# Patient Record
Sex: MALE | Race: WHITE | NOT HISPANIC OR LATINO | Employment: FULL TIME | URBAN - METROPOLITAN AREA
[De-identification: names, ages, dates, MRNs, and addresses within clinical notes are randomized per-mention and may not be internally consistent; named-entity substitution may affect disease eponyms.]

---

## 2017-01-26 DIAGNOSIS — G35 MULTIPLE SCLEROSIS (H): Primary | ICD-10-CM

## 2017-01-26 RX ORDER — DIMETHYL FUMARATE 240 MG/1
240 CAPSULE ORAL 2 TIMES DAILY
Qty: 60 CAPSULE | Refills: 11 | Status: SHIPPED | OUTPATIENT
Start: 2017-01-26 | End: 2017-10-10

## 2017-01-26 NOTE — TELEPHONE ENCOUNTER
Received refill request for Tecfidera from [facility]; Patient was last seen in March 2016 and has follow up appointment in March 2017 with Dr Escobar; Refilled for 1 year per MS refill protocol.    Maci Veloz MS RN Care Coordinator

## 2017-02-16 ENCOUNTER — TELEPHONE (OUTPATIENT)
Dept: NEUROLOGY | Facility: CLINIC | Age: 32
End: 2017-02-16

## 2017-02-16 DIAGNOSIS — G35 MULTIPLE SCLEROSIS (H): Primary | ICD-10-CM

## 2017-02-16 RX ORDER — PREDNISONE 50 MG/1
1250 TABLET ORAL DAILY
Qty: 75 TABLET | Refills: 0 | Status: SHIPPED | OUTPATIENT
Start: 2017-02-16 | End: 2017-02-19

## 2017-02-16 NOTE — TELEPHONE ENCOUNTER
"I received the following message from our triage nurse:    Sx-numb torso to stomach, nausea, cold sweats x 1-2 wks and has been getting progressively worse. Had a bad cold and was rundown-cold sx gone but wife is due to deliver soon and stressful time.    I called Jair to discuss his symptoms; He said that about a week ago, he started having numbness on the left side of his body and in his torso from his nipple line to his middle hip area, as he describes it; He said that the numbness has now progressed over to his left side; He also states that he noticed yesterday that he has shanell-oral numbness; All of his sensory changes are constant; He said that he doesn't feel hunger or appetite at all either, which he attributes to the torso numbness, and states he just \"feels different\"; He does not have any focal weakness, but he does feel generally very fatigued; He said that he has, a long time ago, had numbness in this area, but not to this extent; No vision changes; No speech/swallowing changes; No clumsiness/balance issues notes; He feels like he gets light-headed and dizzy when he \"over does it\" and has been resting as much as possible lately; He has had increased urgency and frequency with his bladder, and doesn't feel that he is emptying his bladder; He has had diarrhea as well lately; He does not feel like he has had the flu at all, but he states that he did have a \"bad cold\" last week; In addition to that, he is under stress, as he has a 2-year old daughter and due to have another baby in a couple weeks; He is taking his Tecfidera and not missing any doses; He states \"I've had lots of relapses before that I didn't get treated for, but feel like this one needs to be treated\"; I told Jair that I would send this over to Dr Escobar, and that he may want to obtain MRI images before moving forward with steroids, and he understands that; Will route to Dr Escobar for input, then call him back later " today.    Maci Veloz MS RN Care Coordinator

## 2017-02-16 NOTE — TELEPHONE ENCOUNTER
Yes, I'd like to get MRIs because if this is true inflammatory relapse that will probably mean long-term treatment change, and in the context of a viral illness it is hard to tell if true inflammatory relapse or not.  Given the distribution of the symptoms, I would like to get all 3 MRIs (C, T, and B) - that's a long time in the scanner, and if necessary they can be broken up into different days (C and T take priority).

## 2017-02-16 NOTE — TELEPHONE ENCOUNTER
Sx-numbness of  Torso extending to stomach, nausea, cold sweats x 1-2 wks and has been getting progressively worse.  Had a bad cold and was rundown-cold sx gone but wife is due to deliver soon;  Pt going through a  stressful time.    Will route to Maci Veloz and Dr Escobar

## 2017-02-16 NOTE — TELEPHONE ENCOUNTER
Patient called the triage nurse line back again and stated that his wife is dilated and that he just doesn't think he is going to have time to get MRIs done; I talked with Dr Escobar, and he is fine with treating him with 3 days of steroids, but he still has to have the imaging done; In previously talking with Jair, he would like to complete this at [facility] (phone [phone] fax [phone]); I called Jair back, and he is fine with the plan; He will call tomorrow to schedule his MRIs at [facility]; Steroids sent to his pharmacy, and a follow up call has been done to verify that this is correct; Once MRI orders signed, will fax accordingly.    Maci Veloz, MS RN Care Coordinator

## 2017-02-16 NOTE — TELEPHONE ENCOUNTER
"I called Jair back and went over with him Dr Escobar's input; He states that he has been too busy to be able to do the MRI imaging, and states that he has been doing so well on the Tecfidera, he doesn't think he would want to change treatments; He said \"if I twisted Dr Escobar's arm, would he let me get the steroids, then I can do the MRIs later?\"; He then asked \"if my MRIs don't show anything, then what?\" and I explained that that would actually be good, and that there wouldn't be anything for the steroids to treat, and that would confirm that his recent illness set off his symptoms; I, again, reiterated with him, too, that if his MRI did show new activity, that would mean the Tecfidera wasn't working for him anymore and we wouldn't want to keep him on a medication that wasn't working for him anymore; MRI Cspine and Tspine orders placed per Dr Escobar, and will call [facility] for scheduling once signed by Dr Escobar.    Maci Veloz, MS RN Care Coordinator    "

## 2017-02-20 ENCOUNTER — MYC MEDICAL ADVICE (OUTPATIENT)
Dept: NEUROLOGY | Facility: CLINIC | Age: 32
End: 2017-02-20

## 2017-02-20 NOTE — TELEPHONE ENCOUNTER
No, nothing else to do at this time except try to get over the viral illness (rest, hydration, and time).  The steroids generally speed up recovery from an inflammatory relapse by about 10 days (compared to no steroids), but there is no way to tell in an individual person how quickly (or how completely) they will recover from an MS attack.

## 2017-02-20 NOTE — TELEPHONE ENCOUNTER
Dr Escobar, please see Jair's update; Other than time, and getting the MRI completed, any other suggestions? Thank you.    Maci Veloz, MS RN Care Coordinator

## 2017-02-23 NOTE — TELEPHONE ENCOUNTER
My Chart message sent to patient stating that we will be in touch once MRI results are back.    Digna Rob MS RN Care Coordinator

## 2017-03-02 ENCOUNTER — TRANSFERRED RECORDS (OUTPATIENT)
Dept: HEALTH INFORMATION MANAGEMENT | Facility: CLINIC | Age: 32
End: 2017-03-02

## 2017-03-06 NOTE — TELEPHONE ENCOUNTER
Jair Whaley's MRI reports are scanned in the media tab and images are in PAC's. Jair has an appointment with you on 3/21. Please let us know if you need us to do anything further. Thank you.    Digna Rob, MS RN Care Coordinator

## 2017-03-06 NOTE — TELEPHONE ENCOUNTER
It looks like he only did the c-spine and t-spine, however he has an MRI brain in 2 weeks with a follow up visit with you after. Thank you.    Digna Rob, MS RN Care Coordinator

## 2017-03-07 ENCOUNTER — MYC MEDICAL ADVICE (OUTPATIENT)
Dept: NEUROLOGY | Facility: CLINIC | Age: 32
End: 2017-03-07

## 2017-03-07 NOTE — TELEPHONE ENCOUNTER
Patient sent [employer] message asking about his MRI results; [employer] message sent to him with that information.    Maci Veloz, MS RN Care Coordinator

## 2017-03-07 NOTE — TELEPHONE ENCOUNTER
Dr Escobar took a look at Jair's MRIs of his spine, which do reveal 1 new enhancing lesion; Jair is scheduled to have his brain MRI on the day of his appointment with Dr Escobar in 2 weeks; Dr Escobar will discuss the next step/treatment change with him at that time; [employer] message sent to Jair with this input in another encounter.    Maci Veloz MS RN Care Coordinator

## 2017-03-21 ENCOUNTER — OFFICE VISIT (OUTPATIENT)
Dept: NEUROLOGY | Facility: CLINIC | Age: 32
End: 2017-03-21
Attending: PSYCHIATRY & NEUROLOGY
Payer: COMMERCIAL

## 2017-03-21 ENCOUNTER — HOSPITAL ENCOUNTER (OUTPATIENT)
Dept: MRI IMAGING | Facility: CLINIC | Age: 32
Discharge: HOME | End: 2017-03-21
Attending: PSYCHIATRY & NEUROLOGY | Admitting: PSYCHIATRY & NEUROLOGY
Payer: COMMERCIAL

## 2017-03-21 VITALS
HEART RATE: 82 BPM | SYSTOLIC BLOOD PRESSURE: 132 MMHG | BODY MASS INDEX: 23.45 KG/M2 | HEIGHT: 70 IN | DIASTOLIC BLOOD PRESSURE: 72 MMHG | WEIGHT: 163.8 LBS

## 2017-03-21 DIAGNOSIS — G35 MULTIPLE SCLEROSIS (H): ICD-10-CM

## 2017-03-21 DIAGNOSIS — G35 MULTIPLE SCLEROSIS (H): Primary | ICD-10-CM

## 2017-03-21 LAB
ALT SERPL W P-5'-P-CCNC: 37 U/L (ref 0–70)
AST SERPL W P-5'-P-CCNC: 20 U/L (ref 0–45)

## 2017-03-21 PROCEDURE — 40000975 ZZHCL STATISTIC JC VIR AB INDEX INHIB: Performed by: PSYCHIATRY & NEUROLOGY

## 2017-03-21 PROCEDURE — 99213 OFFICE O/P EST LOW 20 MIN: CPT

## 2017-03-21 PROCEDURE — 84460 ALANINE AMINO (ALT) (SGPT): CPT | Performed by: PSYCHIATRY & NEUROLOGY

## 2017-03-21 PROCEDURE — 99212 OFFICE O/P EST SF 10 MIN: CPT | Mod: 25

## 2017-03-21 PROCEDURE — 25500064 ZZH RX 255 OP 636: Performed by: PSYCHIATRY & NEUROLOGY

## 2017-03-21 PROCEDURE — 84450 TRANSFERASE (AST) (SGOT): CPT | Performed by: PSYCHIATRY & NEUROLOGY

## 2017-03-21 PROCEDURE — 36415 COLL VENOUS BLD VENIPUNCTURE: CPT | Performed by: PSYCHIATRY & NEUROLOGY

## 2017-03-21 PROCEDURE — A9585 GADOBUTROL INJECTION: HCPCS | Performed by: PSYCHIATRY & NEUROLOGY

## 2017-03-21 PROCEDURE — 70553 MRI BRAIN STEM W/O & W/DYE: CPT

## 2017-03-21 RX ORDER — GADOBUTROL 604.72 MG/ML
7.5 INJECTION INTRAVENOUS ONCE
Status: COMPLETED | OUTPATIENT
Start: 2017-03-21 | End: 2017-03-21

## 2017-03-21 RX ADMIN — GADOBUTROL 7.5 ML: 604.72 INJECTION INTRAVENOUS at 13:50

## 2017-03-21 ASSESSMENT — PAIN SCALES - GENERAL: PAINLEVEL: NO PAIN (0)

## 2017-03-21 NOTE — MR AVS SNAPSHOT
After Visit Summary   3/21/2017    Real Sellers    MRN: [id number]           Patient Information     Date Of Birth          1985        Visit Information        Provider Department      3/21/2017 3:30 PM Yousuf Escobar MD [facility] Multiple Sclerosis        Today's Diagnoses     Multiple sclerosis (H)    -  1       Follow-ups after your visit        Follow-up notes from your care team     Return in about 6 months (around 9/21/2017).      Your next 10 appointments already scheduled     Mar 21, 2017  5:00 PM CDT   LAB with  LAB   [facility] ([facility])    [address] [place] [address] [id number] [phone]           Patient must bring picture ID.  Patient should be prepared to give a urine specimen  Please do not eat 10-12 hours before your appointment if you are coming in fasting for labs on lipids, cholesterol, or glucose (sugar).  Pregnant women should follow their Care Team instructions. Water with medications is okay. Do not drink coffee or other fluids.   If you have concerns about taking  your medications, please ask at office or if scheduling via [employer], send a message by clicking on Secure Messaging, Message Your Care Team.            Sep 26, 2017  3:30 PM CDT   (Arrive by 3:15 PM)   Return Multiple Sclerosis with Yousuf Escobar MD   [facility] Multiple Sclerosis ([facility])    [address] [place] [address] [id number] [phone]              Future tests that were ordered for you today     Open Future Orders        Priority Expected Expires Ordered    AST Routine  3/21/2018 3/21/2017    ALT Routine  3/21/2018 3/21/2017    AURA Virus Antibody (with Index) with Reflex to Inhibition Assay - [id number]: Laboratory Miscellaneous Order Routine  3/21/2018 3/21/2017            Who to contact     If you have questions or need follow up information about today's clinic visit or  "your schedule please contact [facility] directly at [phone].  Normal or non-critical lab and imaging results will be communicated to you by MyChart, letter or phone within 4 business days after the clinic has received the results. If you do not hear from us within 7 days, please contact the clinic through [employer] or phone. If you have a critical or abnormal lab result, we will notify you by phone as soon as possible.  Submit refill requests through [employer] or call your pharmacy and they will forward the refill request to us. Please allow 3 business days for your refill to be completed.          Additional Information About Your Visit        [employer] Information     [employer] gives you secure access to your electronic health record. If you see a primary care provider, you can also send messages to your care team and make appointments. If you have questions, please call your primary care clinic.  If you do not have a primary care provider, please call [phone] and they will assist you.        Care EveryWhere ID     This is your Care EveryWhere ID. This could be used by other organizations to access your [facility] medical records  [id number]        Your Vitals Were     Pulse Height BMI (Body Mass Index)             82 1.778 m (5' 10\") 23.5 kg/m2          Blood Pressure from Last 3 Encounters:   03/21/17 132/72   03/15/16 140/76   03/10/15 132/79    Weight from Last 3 Encounters:   03/21/17 74.3 kg (163 lb 12.8 oz)   03/15/16 72.6 kg (160 lb)   03/25/14 74.8 kg (165 lb)               Primary Care Provider Office Phone # Fax #    Rafi Sellers [phone]       [facility] [address] [facility] [address]        Thank you!     Thank you for choosing [facility]  for your care. Our goal is always to provide you with excellent care. Hearing back from our patients is one way we can continue to improve our services. Please take a few minutes to complete " the written survey that you may receive in the mail after your visit with us. Thank you!             Your Updated Medication List - Protect others around you: Learn how to safely use, store and throw away your medicines at www.disposemymeds.org.          This list is accurate as of: 3/21/17  4:51 PM.  Always use your most recent med list.                   Brand Name Dispense Instructions for use    clobetasol propionate 0.05 % Liqd      Externally apply topically daily       dimethyl fumarate 240 MG Cpdr    TECFIDERA    60 capsule    Take 1 capsule (240 mg) by mouth 2 times daily       sertraline 25 MG tablet    ZOLOFT     Take 1 tablet by mouth daily.       VITAMIN D PO      Take 5,000 mg by mouth

## 2017-03-21 NOTE — NURSING NOTE
Chief Complaint   Patient presents with     RECHECK     [facility] MULTIPLE SCLEROSIS     Siobhan Reynolds MA

## 2017-03-22 ENCOUNTER — MYC MEDICAL ADVICE (OUTPATIENT)
Dept: PHARMACY | Facility: CLINIC | Age: 32
End: 2017-03-22

## 2017-03-23 NOTE — PROGRESS NOTES
REASON FOR VISIT:  Jair Sellers is a 32-year-old man who I follow for multiple sclerosis.  He returns to discuss recent spinal relapse and potential treatment change.  I last saw him in 03/2016.      HISTORY OF PRESENT ILLNESS:  Jair called last month with new numbness in the left trunk that spread to involve the left arm and leg.  He was also very fatigued and had some increased bladder urgency.  At the time he also had an upper respiratory tract infection and was under a lot of stress as his wife was just about to give birth to their second child and he was busy both at work and caring for their 2-year-old daughter.  We treated him empirically with a course of corticosteroids and he subsequently got spinal imaging which showed a slightly enhancing lesion in the rostral thoracic spinal cord.  The truncal paresthesias have improved but not totally resolved.  He is feeling much better overall.  His son, Panchito, was born and is doing fine.  The focus of our visit today was the implications of this recent spinal relapse and whether it warrants treatment change.      PHYSICAL EXAMINATION:  No physical exam was done as the entire visit was spent in counseling and coordination of care and reviewing his neuroimaging.      I reviewed his MRI of the brain from today with him as well as the recent spine MRIs.  The brain MRI shows a moderate to severe burden of T2 hyperintense lesions typical for multiple sclerosis, but there are no new or enhancing lesions compared to the prior exam in 03/2015.  The spinal MRIs are abstracted above.  There are multiple focal T2 hyperintensities throughout the cervical spinal cord and a new faintly enhancing lesion in the posterior spinal cord at about the T2 level.      IMPRESSION:  Relapsing remitting multiple sclerosis with recent spinal relapse while on treatment with dimethyl fumarate.  I spent 60 minutes with Jair today, greater than 50% of which was spent in counseling and  "coordination of care.  We discussed the implications of this recent relapse.  He feels that the Tecfidera has basically been a good drug for him as he has felt more generally well on it than he did when he was taking Copaxone or Rebif.  However, I told him in my opinion this spinal relapse while he was taking it as prescribed is really proof that it is not controlling his MS as well as we would like.  We went over alternatives in detail including natalizumab, Aubagio and rituximab.  The choice among these would really depend on his AURA virus status.  If positive, then natalizumab would not be a good option.  There are no PML cases associated with Aubagio and none that are \"cleanly\" associated with rituximab.  I told him I would view Aubagio as a more \"lateral\" move in terms of efficacy compared to the Tecfidera.  That would not necessarily be the wrong choice but I would consider both natalizumab and rituximab as having a higher probability of achieving adequate disease control.  I went over the mechanism of action of both of those and the mechanics of taking them.  I also described the impending FDA decision on ocrelizumab and the differences between this and rituximab.  Basically I told him that, in my opinion, these 2 medications are essentially equivalent.  Ocrelizumab is a partially humanized anti-CD20 monoclonal antibody whereas rituximab is a standard monoclonal antibody.  The only relevant difference, in my opinion, is perhaps a slightly lower likelihood of infusion-related reactions with ocrelizumab.  Ultimately we decided to test his AURA virus antibody status and let that guide our decision.  If positive, I would lean towards rituximab and if negative, towards natalizumab.  I explained that I generally dose natalizumab every 6 weeks and explained the rationale for this.  He will discuss the matter with his wife and we will make a decision once we see his laboratory results.      PLAN:     1.  AURA virus " antibody with index, AST and ALT today.     2.  I will tentatively arrange for him to return to clinic in 6 months.         DARINEL GUPTA MD             D: 2017 15:10   T: 2017 06:37   MT: nh      Name:     DIEGO MARCUM   MRN:      [id number]        Account:      [id number]   :      1985           Service Date: 2017      Document: [id number]

## 2017-03-27 LAB — LAB SCANNED RESULT: NORMAL

## 2017-03-31 ENCOUNTER — MYC MEDICAL ADVICE (OUTPATIENT)
Dept: NEUROLOGY | Facility: CLINIC | Age: 32
End: 2017-03-31

## 2017-03-31 NOTE — TELEPHONE ENCOUNTER
Dr. Escobar, please see Jair's My Chart message. It looks like his AURA Virus was negative, after reading your most recent office visit, do we want to send him start forms for Tysabri? Thank you.    Digna Rob, MS RN Care Coordinator

## 2017-03-31 NOTE — TELEPHONE ENCOUNTER
Yes, I would recommend we start Tysabri, as per our recent discussion.  Every 6 weeks.  If he is still OK with that, can mail him the forms (or he can  if he wants).

## 2017-03-31 NOTE — TELEPHONE ENCOUNTER
"The AURA virus antibody test has two steps.  In the first step, if the index value is greater than 0.4 the result is \"positive\", and if it's below 0.2 the result is \"negative\".  If it's in between those two (as it was with Jair), it's considered \"indeterminate\" and in that case you have to go on to the second step (2nd test), which is called the inhibition assay.  That was negative - so yes, Jair's test is negative.    Ocrelizumab is a slightly modified version of rituximab, the other drug I discussed with him.  We had planned to use rituximab (or ocrelizumab, it's more expensive twin) if the JCV test was positive, or Tysabri if it was negative.  It's negative, so I would go with the Tysabri.  I have nothing against using ocrelizumab or rituximab instead (I would choose rituximab, because it is much cheaper, but consider them equivalent), but as we discussed at our visit, Tysabri is basically the most effective MS drug, and safe to use if JCV negative, so I can't see any good reason not to use that.  "

## 2017-04-03 NOTE — TELEPHONE ENCOUNTER
My Chart message sent to Jair with the answer to his questions.     Digna Rob MS RN Care Coordinator

## 2017-04-03 NOTE — TELEPHONE ENCOUNTER
My Chart message sent to Jair letting him know Dr. Escobar's input.    Digna Rob MS RN Care Coordinator

## 2017-04-12 NOTE — TELEPHONE ENCOUNTER
My Chart message sent to Jair verfiying address.     Dr. Escobar, should Jair refill his Tecfidera (he has 2 weeks left). Tysabri start forms not finished (got lost in mail), mailing him new forms. Thank you.    Digna Rob, MS RN Care Coordinator

## 2017-04-12 NOTE — TELEPHONE ENCOUNTER
I think he probably doesn't need to refill it.  When he gets down to 10 left, he can start taking just one a day (should enough time to get the Tysabri started).  But if his (or infusion center) schedule is such that it will be more than a week between Touch approval and 1st infusion, he should go ahead and refill it.

## 2017-04-12 NOTE — TELEPHONE ENCOUNTER
My Chart message sent to Jair letting him know that new package was sent and updated address in Epic.    Digna Rob MS RN Care Coordinator

## 2017-04-17 ENCOUNTER — TELEPHONE (OUTPATIENT)
Dept: NEUROLOGY | Facility: CLINIC | Age: 32
End: 2017-04-17

## 2017-04-17 RX ORDER — DIPHENHYDRAMINE HYDROCHLORIDE 50 MG/ML
25 INJECTION INTRAMUSCULAR; INTRAVENOUS
Status: CANCELLED
Start: 2017-04-17

## 2017-04-17 NOTE — TELEPHONE ENCOUNTER
Dr Escobar, please see Jair's [employer] message; I don't think there is anything else to do at this point, and hopefully we will get the form back tomorrow or the next day.    Maci Veloz, MS RN Care Coordinator

## 2017-04-17 NOTE — TELEPHONE ENCOUNTER
Orders placed for Tysabri infusion every 6 weeks per Dr. Escobar.     Digna Rob MS RN Care Coordinator

## 2017-04-20 NOTE — TELEPHONE ENCOUNTER
My Chart message sent to Jair letting him know that we received his paperwork and that it was faxed to [facility].    Digna Rob MS RN Care Coordinator

## 2017-04-21 NOTE — TELEPHONE ENCOUNTER
Tysabri orders faxed to [facility] (phone [phone] fax [phone]); [employer] message sent to patient letting him know this.    Maci Veloz, MS RN Care Coordinator

## 2017-04-26 ENCOUNTER — MYC MEDICAL ADVICE (OUTPATIENT)
Dept: NEUROLOGY | Facility: CLINIC | Age: 32
End: 2017-04-26

## 2017-04-27 NOTE — TELEPHONE ENCOUNTER
Our prior authorization team worked on Jair's PA for his Tysabri. My Chart message sent to Jair letting him know this was done.    Digna Rob MS RN Care Coordinator

## 2017-05-01 ENCOUNTER — MYC MEDICAL ADVICE (OUTPATIENT)
Dept: NEUROLOGY | Facility: CLINIC | Age: 32
End: 2017-05-01

## 2017-05-01 NOTE — TELEPHONE ENCOUNTER
Given the recent relapse, I would refill it in this situation (but it's right on the borderline - if it were 1.5 weeks I'd say skip it).

## 2017-05-01 NOTE — TELEPHONE ENCOUNTER
[employer] message sent to patient with Dr Escobar's input.    Maci Veloz, MS RN Care Coordinator

## 2017-05-01 NOTE — TELEPHONE ENCOUNTER
I called [facility] to let them know that no PA was needed for his Tysabri infusion. I spoke with Rosa at [facility] and she states that they will have someone contact Jair to set-up his infusion. My Chart message sent to Jair letting him know this.    Digna Rob, MS RN Care Coordinator

## 2017-05-01 NOTE — TELEPHONE ENCOUNTER
Patient sent [employer] message stating that JOSE MANUEL Urbano is advising that his Tysabri infusions be every 4 weeks, as opposed to every 6 weeks; [employer] message sent to Jair assuring him that Dr Escobar does dosing every 6 weeks, even though the standard is every 4 weeks, and to have the infusion center call us if they have questions.    Maci Veloz, MS RN Care Coordinator

## 2017-05-01 NOTE — TELEPHONE ENCOUNTER
Jair Jenkins is out of Tecfidera and isn't scheduled for his first Tysabri infusion at [facility] for another 2 1/2 weeks; Do you think he should order more, or do you think he will be okay without? Thank you.    Maci Veloz, MS RN Care Coordinator

## 2017-05-09 ENCOUNTER — MEDICAL CORRESPONDENCE (OUTPATIENT)
Dept: HEALTH INFORMATION MANAGEMENT | Facility: CLINIC | Age: 32
End: 2017-05-09

## 2017-05-16 ENCOUNTER — MEDICAL CORRESPONDENCE (OUTPATIENT)
Dept: HEALTH INFORMATION MANAGEMENT | Facility: CLINIC | Age: 32
End: 2017-05-16

## 2017-06-19 ENCOUNTER — MYC MEDICAL ADVICE (OUTPATIENT)
Dept: NEUROLOGY | Facility: CLINIC | Age: 32
End: 2017-06-19

## 2017-06-19 NOTE — TELEPHONE ENCOUNTER
Jair sent "[employer] message about the fact that [facility] will no longer infuse Tysabri; I was notified of this last week, and will start working with the Tysabri Touch Program on finding new facilities for our patients; "[employer] message sent to patient letting him know that I will be in touch with him this week regarding infusion center options.    Maci Veloz, MS RN Care Coordinator

## 2017-06-21 NOTE — TELEPHONE ENCOUNTER
Jair would like me to see if I can get him in at [facility] in [place] (phone [phone] fax [phone]); I called and spoke with someone there, and she is going to check with her manager if now that [place] [facility] are merged, when our providers will have privileges for the infusion center, as far as writing orders; She will call me back once she has information.    Maci Veloz, MS RN Care Coordinator

## 2017-06-27 NOTE — TELEPHONE ENCOUNTER
Jair is scheduled for his next Tysabri infusion at [facility] tomorrow; I think we should get him in at [facility] for an infusion center, until hopefully, [facility] will allow our providers privileges there; [employer] message sent to patient letting him know this; Will start infusion center change process later this week.    Maci Veloz, MS RN Care Coordinator

## 2017-06-27 NOTE — TELEPHONE ENCOUNTER
Jair is going to get his Tysabri tomorrow at [facility]; I will start working on the transition later this week.    Maci Veloz, MS RN Care Coordinator

## 2017-06-28 ENCOUNTER — MEDICAL CORRESPONDENCE (OUTPATIENT)
Dept: HEALTH INFORMATION MANAGEMENT | Facility: CLINIC | Age: 32
End: 2017-06-28

## 2017-06-30 NOTE — TELEPHONE ENCOUNTER
Jair's infusion center auth has been changed over to [facility] (phon [phone] fax [phone]); I have faxed over orders, office visit note, labs and auth to their facility; [employer] message sent to patient letting him know this.    Maci Veloz, MS RN Care Coordinator

## 2017-09-12 ENCOUNTER — MYC MEDICAL ADVICE (OUTPATIENT)
Dept: NEUROLOGY | Facility: CLINIC | Age: 32
End: 2017-09-12

## 2017-09-25 ENCOUNTER — MYC MEDICAL ADVICE (OUTPATIENT)
Dept: NEUROLOGY | Facility: CLINIC | Age: 32
End: 2017-09-25

## 2017-09-25 NOTE — TELEPHONE ENCOUNTER
Jair sent [employer] message asking when he is supposed to have his next AURA Virus drawn; He had it done in March and has a follow up appointment with Dr. Escobar in October; [employer] message sent to him advising that generally it is checked every 6 months, and that we will check it when he is here in a couple weeks.    Maci Veloz, MS RN Care Coordinator

## 2017-09-26 ENCOUNTER — MYC MEDICAL ADVICE (OUTPATIENT)
Dept: NEUROLOGY | Facility: CLINIC | Age: 32
End: 2017-09-26

## 2017-09-26 NOTE — TELEPHONE ENCOUNTER
Pt had to cancel appointment with Dr. Escobar. Will have Clinic Coordinator contact to reschedule.    Susan Frey, RN Care Coordinator

## 2017-10-10 ENCOUNTER — OFFICE VISIT (OUTPATIENT)
Dept: NEUROLOGY | Facility: CLINIC | Age: 32
End: 2017-10-10
Attending: PSYCHIATRY & NEUROLOGY
Payer: COMMERCIAL

## 2017-10-10 VITALS
SYSTOLIC BLOOD PRESSURE: 111 MMHG | DIASTOLIC BLOOD PRESSURE: 75 MMHG | RESPIRATION RATE: 16 BRPM | HEART RATE: 78 BPM | WEIGHT: 165.4 LBS | TEMPERATURE: 97.5 F | BODY MASS INDEX: 23.68 KG/M2 | HEIGHT: 70 IN | OXYGEN SATURATION: 98 %

## 2017-10-10 DIAGNOSIS — G35 MULTIPLE SCLEROSIS (H): Primary | ICD-10-CM

## 2017-10-10 DIAGNOSIS — G35 MULTIPLE SCLEROSIS (H): ICD-10-CM

## 2017-10-10 PROCEDURE — 83516 IMMUNOASSAY NONANTIBODY: CPT | Performed by: PSYCHIATRY & NEUROLOGY

## 2017-10-10 PROCEDURE — 36415 COLL VENOUS BLD VENIPUNCTURE: CPT | Performed by: PSYCHIATRY & NEUROLOGY

## 2017-10-10 PROCEDURE — 40000975 ZZHCL STATISTIC JC VIR AB INDEX INHIB: Performed by: PSYCHIATRY & NEUROLOGY

## 2017-10-10 PROCEDURE — 99213 OFFICE O/P EST LOW 20 MIN: CPT | Mod: ZF

## 2017-10-10 ASSESSMENT — PAIN SCALES - GENERAL: PAINLEVEL: NO PAIN (0)

## 2017-10-10 NOTE — NURSING NOTE
"Chief Complaint   Patient presents with     RECHECK     [facility]- MULTIPLE SCLEROSIS F/U       Initial /75  Pulse 78  Temp 97.5  F (36.4  C) (Oral)  Resp 16  Ht 1.778 m (5' 10\")  Wt 75 kg (165 lb 6.4 oz)  SpO2 98%  BMI 23.73 kg/m2 Estimated body mass index is 23.73 kg/(m^2) as calculated from the following:    Height as of this encounter: 1.778 m (5' 10\").    Weight as of this encounter: 75 kg (165 lb 6.4 oz).  Medication Reconciliation: complete     Jaydon Urrutia, [facility]    ]  "

## 2017-10-12 NOTE — PROGRESS NOTES
"REASON FOR VISIT:  Jair Sellers is a 32-year-old man who I follow for multiple sclerosis and who returns today for routine followup.  I last saw him in 03/2017.      HISTORY OF PRESENT ILLNESS:  Jair has now been on Tysabri for about 6 months.  This was started in the spring after he had a spinal relapse while on Tecfidera.  He is tolerating the Tysabri fine.  It is being infused every 6 weeks.  He tells me he has not been feeling great in the last 6 weeks, mainly with increased fatigue and just feeling \"in the dumps\", though he denies feeling depressed.  He has had some intermittent numbness in the legs.  He remains rather focused on the fact that Tysabri has not made him feel better, and I reiterated that like all MS immunotherapies it is not going to affect his day-to-day symptoms but rather its role is to prevent new relapses and new central nervous system lesions.  He takes 5000 units of vitamin D per day and his last level in 2016 was 93.  He has not had any interim non-neurologic medical issues.      PHYSICAL EXAMINATION:   VITAL SIGNS:  Blood pressure 111/75.  Pulse 78.  Weight 165 pounds.     NEUROLOGIC:  He is alert and oriented.  Affect is bright and language functions are normal.  Cranial nerves are unremarkable.  Muscle bulk, tone, strength and dexterity are normal in the arms and legs.  Light touch is intact in the hands and feet.  Coordination testing is normal to finger tapping, toe tapping and finger-nose-finger.  Deep tendon reflexes are normal and symmetric and his gait is normal.      IMPRESSION:  Relapsing remitting multiple sclerosis, clinically stable on Tysabri.      I spent 30 minutes with Jair today, greater than 50% of which was spent in counseling and coordination of care.  We discussed fatigue in MS in detail.  We went over lifestyle approaches such as exercise and strategic rests, as well as pharmacologic approaches.  He is on low dose sertraline and I raised the possibility " of increasing that but he was ultimately not interested.  We also discussed amantadine and stimulant preparations, but ultimately he would prefer to work on increasing his cardiovascular exercise first which I think is perfectly reasonable.      PLAN:     1.  AURA virus antibody and anti-natalizumab antibody today.     2.  He will return to clinic in 6 months.         DARINEL GUPTA MD             D: 10/12/2017 13:24   T: 10/12/2017 14:15   MT: nh      Name:     DIEGO MARCUM   MRN:      [id number]        Account:      [id number]   :      1985           Service Date: 10/10/2017      Document: [id number]

## 2017-10-16 ENCOUNTER — MYC MEDICAL ADVICE (OUTPATIENT)
Dept: NEUROLOGY | Facility: CLINIC | Age: 32
End: 2017-10-16

## 2017-10-16 DIAGNOSIS — G35 MULTIPLE SCLEROSIS (H): Primary | ICD-10-CM

## 2017-10-16 NOTE — TELEPHONE ENCOUNTER
Dr. Escobar, please see Jair's [employer] message; Are you okay with me sending that in? It looks like you would be taking this prescription over for him; Thank you.    Maci Veloz, MS RN Care Coordinator

## 2017-10-16 NOTE — TELEPHONE ENCOUNTER
Dr. Escobar, I'm just noticing that the dose would be changed, according to what he is asking; It looks like he has previously been on sertraline 25mg, and he is asking for 50mg; I just wanted to double check; Thank you.    Maci Veloz, MS RN Care Coordinator

## 2017-10-17 LAB — LAB SCANNED RESULT: NORMAL

## 2017-10-17 NOTE — TELEPHONE ENCOUNTER
We did discuss increasing it to 50 mg, and he was not interested in changing it at the time.  I am not 100% sure he is not already taking 50 mg (although the chart says 25).  I am fine with either dose (25 or 50 mg qday) - can you confirm with him that he wants 50 mg?

## 2017-10-17 NOTE — TELEPHONE ENCOUNTER
[employer] message sent to patient for clarification; Will wait to hear back from him.    Maci Veloz MS RN Care Coordinator

## 2017-10-17 NOTE — TELEPHONE ENCOUNTER
Jair is already taking sertraline 50mg; That prescription has been sent to the pharmacy; [employer] [place] message sent to him letting him know this.    Maci Veloz, MS RN Care Coordinator

## 2017-10-19 ENCOUNTER — MYC MEDICAL ADVICE (OUTPATIENT)
Dept: NEUROLOGY | Facility: CLINIC | Age: 32
End: 2017-10-19

## 2017-10-19 LAB — NATALIZUMAB AB SER QL: NEGATIVE

## 2017-10-19 NOTE — TELEPHONE ENCOUNTER
Patient sent [employer] message asking about his lab results; AURA Virus is negative; [employer] message sent to patient advising him of this.    Maci Veloz, MS RN Care Coordinator

## 2018-02-20 ENCOUNTER — TELEPHONE (OUTPATIENT)
Dept: NEUROLOGY | Facility: CLINIC | Age: 33
End: 2018-02-20

## 2018-02-20 DIAGNOSIS — E55.9 VITAMIN D DEFICIENCY: ICD-10-CM

## 2018-02-20 DIAGNOSIS — G35 MULTIPLE SCLEROSIS (H): Primary | ICD-10-CM

## 2018-02-20 NOTE — TELEPHONE ENCOUNTER
I received the following message from the call center:    Call from Catrina at [facility] in re: to PT stating he is to have a blood draw for the AURA Virus every 6 months.  If needed, PT would like the order faxed to [phone]  to get done when he comes back in 6 weeks.  Please call Catrina at [phone] to confirm.     I talked with Dr. Escobar, and he said that we could check the AURA Virus, as well as AST, ALT, and Vitamin D; Those orders have been placed per Dr. Escobar; Will fax once signed.    Maci Veloz, MS RN Care Coordinator

## 2018-02-20 NOTE — TELEPHONE ENCOUNTER
I have faxed the signed lab orders to [facility]; I called the infusion center back and let them know this.    Maci Veloz, MS RN Care Coordinator

## 2018-04-04 ENCOUNTER — TRANSFERRED RECORDS (OUTPATIENT)
Dept: HEALTH INFORMATION MANAGEMENT | Facility: CLINIC | Age: 33
End: 2018-04-04

## 2018-04-12 ENCOUNTER — MYC MEDICAL ADVICE (OUTPATIENT)
Dept: NEUROLOGY | Facility: CLINIC | Age: 33
End: 2018-04-12

## 2018-05-08 ENCOUNTER — TELEPHONE (OUTPATIENT)
Dept: NEUROLOGY | Facility: CLINIC | Age: 33
End: 2018-05-08

## 2018-05-08 NOTE — TELEPHONE ENCOUNTER
[facility] Call Center    Phone Message    May a detailed message be left on voicemail: no    Reason for Call: Order(s): Other:   Reason for requested: Micah from [facility] called to request Dr. Escobar send an updated order for Tysabri infusions. She is requesting this be faxed to [phone]. She also wants to know when the Pt last had the JCV Virus checked, which can be included in the fax.  Date needed: Asap  Provider name: Dr. Escobar      Action Taken: Message routed to:  [facility]): [facility] NEUROLOGY ADULT [facility]

## 2018-05-08 NOTE — TELEPHONE ENCOUNTER
Updated Tysabri therapy plan orders faxed to the infusion center.    Maci Veloz, MS RN Care Coordinator

## 2018-05-15 ENCOUNTER — OFFICE VISIT (OUTPATIENT)
Dept: NEUROLOGY | Facility: CLINIC | Age: 33
End: 2018-05-15
Attending: PSYCHIATRY & NEUROLOGY
Payer: COMMERCIAL

## 2018-05-15 VITALS
BODY MASS INDEX: 24.05 KG/M2 | HEIGHT: 70 IN | WEIGHT: 168 LBS | HEART RATE: 70 BPM | DIASTOLIC BLOOD PRESSURE: 81 MMHG | SYSTOLIC BLOOD PRESSURE: 145 MMHG

## 2018-05-15 DIAGNOSIS — G35 MULTIPLE SCLEROSIS (H): Primary | ICD-10-CM

## 2018-05-15 PROCEDURE — G0463 HOSPITAL OUTPT CLINIC VISIT: HCPCS | Mod: ZF

## 2018-05-15 ASSESSMENT — PAIN SCALES - GENERAL: PAINLEVEL: NO PAIN (0)

## 2018-05-15 NOTE — MR AVS SNAPSHOT
After Visit Summary   5/15/2018    Real Sellers    MRN: [id number]           Patient Information     Date Of Birth          1985        Visit Information        Provider Department      5/15/2018 11:30 AM Yousuf Escobar MD [facility] Multiple Sclerosis        Today's Diagnoses     Multiple sclerosis (H)    -  1       Follow-ups after your visit        Follow-up notes from your care team     Return in about 1 year (around 5/15/2019) for with MRI.      Your next 10 appointments already scheduled     May 07, 2019 10:45 AM CDT   MR BRAIN W/O CONTRAST with [facility] [facility] [place] [facility] ([facility])    [address]  1st Floor  [facility] [address] [phone]           Take your medicines as usual, unless your doctor tells you not to. Bring a list of your current medicines to your exam (including vitamins, minerals and over-the-counter drugs). Also bring the results of similar scans you may have had.  Please remove any body piercings and hair extensions before you arrive.  Follow your doctor s orders. If you do not, we may have to postpone your exam.  You may or may not receive IV contrast for this exam pending the discretion of the Radiologist.  You do not need to do anything special to prepare.  The MRI machine uses a strong magnet. Please wear clothes without metal (snaps, zippers). A sweatsuit works well, or we may give you a hospital gown.   **IMPORTANT** THE INSTRUCTIONS BELOW ARE ONLY FOR THOSE PATIENTS WHO HAVE BEEN PRESCRIBED SEDATION OR GENERAL ANESTHESIA DURING THEIR MRI PROCEDURE:  IF YOUR DOCTOR PRESCRIBED ORAL SEDATION (take medicine to help you relax during your exam):   You must get the medicine from your doctor (oral medication) before you arrive. Bring the medicine to the exam. Do not take it at home. You ll be told when to take it upon arriving for your exam.   Arrive one hour early. Bring someone who can take you home  after the test. Your medicine will make you sleepy. After the exam, you may not drive, take a bus or take a taxi by yourself.  IF YOUR DOCTOR PRESCRIBED IV SEDATION:   Arrive one hour early. Bring someone who can take you home after the test. Your medicine will make you sleepy. After the exam, you may not drive, take a bus or take a taxi by yourself.   No eating 6 hours before your exam. You may have clear liquids up until 4 hours before your exam. (Clear liquids include water, clear tea, black coffee and fruit juice without pulp.)  IF YOUR DOCTOR PRESCRIBED ANESTHESIA (be asleep for your exam):   Arrive 1 1/2 hours early. Bring someone who can take you home after the test. You may not drive, take a bus or take a taxi by yourself.   No eating 8 hours before your exam. You may have clear liquids up until 4 hours before your exam. (Clear liquids include water, clear tea, black coffee and fruit juice without pulp.)   You will spend four to five hours in the recovery room.  Please call the Imaging Department at your exam site with any questions.            May 07, 2019 12:00 PM CDT   (Arrive by 11:45 AM)   Return Multiple Sclerosis with Yousuf Escobar MD   [facility] Multiple Sclerosis ([facility])    [address] [place] [address]  [address] [place] [address] [phone]              Who to contact     If you have questions or need follow up information about today's clinic visit or your schedule please contact [facility] MULTIPLE SCLEROSIS directly at [phone].  Normal or non-critical lab and imaging results will be communicated to you by MyChart, letter or phone within 4 business days after the clinic has received the results. If you do not hear from us within 7 days, please contact the clinic through MyChart or phone. If you have a critical or abnormal lab result, we will notify you by phone as soon as possible.  Submit refill requests through [employer] or call your pharmacy and they  "will forward the refill request to us. Please allow 3 business days for your refill to be completed.          Additional Information About Your Visit        [employer] Information     [employer] gives you secure access to your electronic health record. If you see a primary care provider, you can also send messages to your care team and make appointments. If you have questions, please call your primary care clinic.  If you do not have a primary care provider, please call [phone] and they will assist you.        Care EveryWhere ID     This is your Care EveryWhere ID. This could be used by other organizations to access your [place] medical records  [id number]        Your Vitals Were     Pulse Height BMI (Body Mass Index)             70 1.778 m (5' 10\") 24.11 kg/m2          Blood Pressure from Last 3 Encounters:   05/15/18 145/81   10/10/17 111/75   03/21/17 132/72    Weight from Last 3 Encounters:   05/15/18 76.2 kg (168 lb)   10/10/17 75 kg (165 lb 6.4 oz)   03/21/17 74.3 kg (163 lb 12.8 oz)               Primary Care Provider Office Phone # Fax #    Rafi Sellers [phone]       [address] [address]        Equal Access to Services     JASMEET YUAN : Hadii aad ku hadasho Soomaali, waaxda luqadaha, qaybta kaalmada adeegyada, waxay idiin hayaan grzegorz kharacarmelo lajose . So [facility] [phone].    ATENCIÓN: Si habla español, tiene a moreira disposición servicios gratuitos de asistencia lingüística. [facility] [phone].    We comply with applicable federal civil rights laws and [place] laws. We do not discriminate on the basis of race, color, national origin, age, disability, sex, sexual orientation, or gender identity.            Thank you!     Thank you for choosing [facility] MULTIPLE SCLEROSIS  for your care. Our goal is always to provide you with excellent care. Hearing back from our patients is one way we can continue to improve our services. Please take a few " minutes to complete the written survey that you may receive in the mail after your visit with us. Thank you!             Your Updated Medication List - Protect others around you: Learn how to safely use, store and throw away your medicines at www.disposemymeds.org.          This list is accurate as of 5/15/18 11:59 PM.  Always use your most recent med list.                   Brand Name Dispense Instructions for use Diagnosis    ALLEGRA PO           clobetasol propionate 0.05 % Liqd      Externally apply 1 Application topically daily    Multiple sclerosis (H)       natalizumab 300 MG/15ML injection    TYSABRI     Inject 300 mg into the vein once every six weeks    Multiple sclerosis (H)       sertraline 50 MG tablet    ZOLOFT    30 tablet    Take 1 tablet (50 mg) by mouth daily    Multiple sclerosis (H)       VITAMIN D PO      Take 5,000 mg by mouth daily    Multiple sclerosis (H)

## 2018-05-15 NOTE — Clinical Note
5/15/2018       RE: Real Sellers  [address] [place] [address]     Dear Colleague,    Thank you for referring your patient, Real Sellers, to the [facility] MULTIPLE SCLEROSIS at [facility]. Please see a copy of my visit note below.    No notes on file    Again, thank you for allowing me to participate in the care of your patient.      Sincerely,    Yousuf Escobar MD

## 2018-05-16 ENCOUNTER — TELEPHONE (OUTPATIENT)
Dept: NEUROLOGY | Facility: CLINIC | Age: 33
End: 2018-05-16

## 2018-05-16 NOTE — TELEPHONE ENCOUNTER
Called  infusion to clarify Tysabri orders. They state they need orders re-faxed as they have been misplaced. This has been done to the number below and I informed them that every 6 week dosing is correct.

## 2018-05-16 NOTE — TELEPHONE ENCOUNTER
[facility] Call Center    Phone Message    May a detailed message be left on voicemail: yes    Reason for Call: Other: Jaye from [facility] infusion calling to verify orders for an infusion that a pt is recieving tomorrow. They are unsure on the frequency that Dr Zavala wanted. Please fax over correct order to [phone] or call at [phone]. Thanks     Action Taken: Message routed to:  [facility]): Neuro

## 2018-05-17 NOTE — PROGRESS NOTES
Service Date: 05/15/2018      REASON FOR VISIT:  Jair Sellers is a 33-year-old man who I follow for multiple sclerosis.  He returns for routine followup.  I last saw him in 10/2017.      HISTORY OF PRESENT ILLNESS:  Jair has been doing great from an MS perspective.  He has had no recent relapses, and none since starting on natalizumab in the spring of 2017.  He also has essentially no daily residual symptoms from MS.  Very rarely he will get some evanescent tingling in his legs.  He notes that he had the flu this winter, and was treated with Tamiflu.  He was rather surprised that he had no concurrent issues with MS associated with that illness.  He has been more active, working out 3-4 days a week, alternating between an elliptical and lifting weights.  He tolerates the natalizumab fine.  He is getting infusions every 6 weeks.  He has had no serious non-neurologic medical issues since I saw him last, other than the flu this past winter.      PHYSICAL EXAMINATION:   VITAL SIGNS:  Blood pressure 145/81.  Pulse 70.  Weight 168 pounds.   GENERAL:  He is alert and oriented.  Affect is bright and language functions are normal.  Cranial nerves are unremarkable.  Muscle bulk, tone, strength and dexterity are normal in the arms and legs.  Finger tapping, toe tapping and finger-nose-finger are all normal.  Light touch is intact in the arms and legs.  Deep tendon reflexes are normal and symmetric and his gait is normal.      IMPRESSION:  Relapsing remitting multiple sclerosis, clinically stable on natalizumab.        I spent 25 minutes with Jair greater than 50% of which was spent in counseling and coordination of care.  We reviewed recent labs he had in 04/2018 which were drawn at the [facility].  This included a AURA virus antibody test with an index of 0.5 and a negative inhibition assay.  Vitamin D was perfect at 65.  ALT and AST were normal at 27 and 25 respectively.  We discussed  the AURA virus results.  He was somewhat concerned at the indeterminant initial test but looking back, his antibody index has always been in that range and the secondary inhibition assay has always been negative.  I told him in those situations I have little concern about the risk of PML.  We discussed timing of followup and I told him as long as he is this stable I am happy to see him on an annual basis.      PLAN:  He will return in 1 year with repeat MRI at that time.      MD DARINEL Hurst MD             D: 2018   T: 2018   MT: AKA      Name:     DIEGO MARCUM   MRN:      [id number]        Account:      [id number]   :      1985           Service Date: 05/15/2018      Document: [id number]

## 2019-02-26 ENCOUNTER — OFFICE VISIT (OUTPATIENT)
Dept: NEUROLOGY | Facility: CLINIC | Age: 34
End: 2019-02-26
Attending: PSYCHIATRY & NEUROLOGY
Payer: COMMERCIAL

## 2019-02-26 VITALS
DIASTOLIC BLOOD PRESSURE: 82 MMHG | HEIGHT: 70 IN | WEIGHT: 161.9 LBS | HEART RATE: 57 BPM | BODY MASS INDEX: 23.18 KG/M2 | SYSTOLIC BLOOD PRESSURE: 143 MMHG

## 2019-02-26 DIAGNOSIS — R53.83 OTHER FATIGUE: ICD-10-CM

## 2019-02-26 DIAGNOSIS — N31.9 NEUROGENIC BLADDER: ICD-10-CM

## 2019-02-26 DIAGNOSIS — G35 MULTIPLE SCLEROSIS (H): Primary | ICD-10-CM

## 2019-02-26 PROCEDURE — G0463 HOSPITAL OUTPT CLINIC VISIT: HCPCS | Mod: ZF

## 2019-02-26 RX ORDER — DEXTROAMPHETAMINE SACCHARATE, AMPHETAMINE ASPARTATE, DEXTROAMPHETAMINE SULFATE AND AMPHETAMINE SULFATE 2.5; 2.5; 2.5; 2.5 MG/1; MG/1; MG/1; MG/1
10 TABLET ORAL 2 TIMES DAILY
Qty: 60 TABLET | Refills: 0 | Status: SHIPPED | OUTPATIENT
Start: 2019-02-26 | End: 2019-03-20

## 2019-02-26 ASSESSMENT — MIFFLIN-ST. JEOR: SCORE: 1690.62

## 2019-02-26 ASSESSMENT — PAIN SCALES - GENERAL: PAINLEVEL: MODERATE PAIN (5)

## 2019-02-28 NOTE — PROGRESS NOTES
"Service Date: 02/26/2019      REASON FOR VISIT:  Real Sellers is a 34-year-old man who I follow for multiple sclerosis.  He returns earlier than scheduled because of worsened symptoms.  I last saw him in 05/2018.      HISTORY OF PRESENT ILLNESS:  Jair tells me he was sick over [place] with URI-type symptoms, and that ever since, he has noticed a general consistent worsening of previous symptoms.  Mainly this is fluctuating numbness and pain on his left side which bothers him mostly at night.  He has been much more fatigued, basically just feeling like he does not have any energy.  He went to [place] recently on vacation, got lots of sun, relaxed and was expecting to feel much better but really did not.  He also notes worsened bladder urgency and a couple of episodes of nocturnal enuresis over the past few months.  He continues to take natalizumab every 6 weeks and is tolerating that fine.  He is a bit demoralized because prior to these last few months, he has felt great since starting the Tysabri, but feels that \"all those gains are now gone.\"      PHYSICAL EXAMINATION:  Blood pressure 143/82, pulse 57, weight 161 pounds.  He is alert and oriented.  Affect is neutral.  Language functions are normal.  Cranial nerves are unremarkable.  Muscle bulk, tone, strength and dexterity are normal in the arms and legs.  Light touch is intact in the hands and feet.  Deep tendon reflexes are normal and symmetric and his gait is normal.      IMPRESSION:  Relapsing-remitting multiple sclerosis with no symptoms suggestive of MS relapse, but a general worsening of fatigue, bladder function and sensory symptoms over the past couple of months.      I spent 34 minutes with Jair, greater than 50% of which was spent in counseling and coordination of care.  We primarily discussed the overactive bladder as well as fatigue management.  He also wanted to review the rationale for giving Tysabri every 6 weeks versus every 4 and we " reviewed that as well.  I discussed management of overactive bladder, including timed voiding, scheduling fluid intake, Kegel exercises as well as medications such as tricyclics or anticholinergics.  He ultimately opted to defer any pharmacologic treatment of that for now.        Regarding the fatigue, we discussed management of that as well including both lifestyle and pharmacologic approaches.  He is already exercising regularly.  We talked about increasing his sertraline (I am wondering about some degree of seasonal affective disorder) but ultimately we deferred that for now.  We discussed amantadine and the stimulant preparations.  As the fatigue fluctuates, he would rather use the latter as something that could be done on an as-needed basis.  He is familiar with methylphenidate and Adderall due to being treated with them for attention deficit during his childhood.  We discussed the alternatives and ultimately he decided he would like to try Adderall as he is familiar with that medication.      PLAN:   1.  Adderall 10 mg p.o. b.i.d. p.r.n.   2.  We discussed strategies to avoid tolerance.   3.  Return in May with repeat brain MRI as previously scheduled.         DARINEL GUPTA MD             D: 2019   T: 2019   MT: KIRILL      Name:     DIEGO MARCUM   MRN:      [id number]        Account:      [id number]   :      1985           Service Date: 2019      Document: [id number]

## 2019-03-20 ENCOUNTER — MYC REFILL (OUTPATIENT)
Dept: NEUROLOGY | Facility: CLINIC | Age: 34
End: 2019-03-20

## 2019-03-20 DIAGNOSIS — G35 MULTIPLE SCLEROSIS (H): ICD-10-CM

## 2019-03-20 NOTE — TELEPHONE ENCOUNTER
Patient sent [employer] message requesting refill of their Adderall; Patient was last seen in February and has follow up appointment in May with Dr. Escobar. Pended rx to Dr. Escobar for signature and will mail to the pharmacy once signed.    Susan LIU

## 2019-03-26 RX ORDER — DEXTROAMPHETAMINE SACCHARATE, AMPHETAMINE ASPARTATE, DEXTROAMPHETAMINE SULFATE AND AMPHETAMINE SULFATE 2.5; 2.5; 2.5; 2.5 MG/1; MG/1; MG/1; MG/1
10 TABLET ORAL 2 TIMES DAILY
Qty: 60 TABLET | Refills: 0 | Status: SHIPPED | OUTPATIENT
Start: 2019-03-26 | End: 2019-05-06

## 2019-03-26 NOTE — TELEPHONE ENCOUNTER
Rx placed in the mail to the pharmacy; [employer] message sent to patient letting him know this.    Maci Veloz MS RN Care Coordinator

## 2019-04-23 ENCOUNTER — MYC MEDICAL ADVICE (OUTPATIENT)
Dept: NEUROLOGY | Facility: CLINIC | Age: 34
End: 2019-04-23

## 2019-04-23 DIAGNOSIS — G35 MULTIPLE SCLEROSIS (H): Primary | ICD-10-CM

## 2019-04-23 NOTE — TELEPHONE ENCOUNTER
Jair is due to have his AURA Virus antibody checked; [employer] message sent to Jair letting him know this; The lab orders have been faxed to [facility] (phone [phone] fax [phone]) to have drawn with his next infusion.    Maci Veloz MS RN Care Coordinator

## 2019-04-24 ENCOUNTER — TRANSFERRED RECORDS (OUTPATIENT)
Dept: HEALTH INFORMATION MANAGEMENT | Facility: CLINIC | Age: 34
End: 2019-04-24

## 2019-04-25 NOTE — TELEPHONE ENCOUNTER
I called Jair to let him know we needed to have his AURA Virus drawn, which he apparently had his infusion yesterday and they faith the lab; Will keep an eye out for the results.    Maci Veloz, MS RN Care Coordinator

## 2019-05-06 ENCOUNTER — MYC REFILL (OUTPATIENT)
Dept: NEUROLOGY | Facility: CLINIC | Age: 34
End: 2019-05-06

## 2019-05-06 DIAGNOSIS — G35 MULTIPLE SCLEROSIS (H): ICD-10-CM

## 2019-05-06 NOTE — TELEPHONE ENCOUNTER
Patient sent [employer] message asking for a refill of his Adderall and sertraline; The patient was last seen in November and has a follow up appointment with Dr. Escobar tomorrow; Prescriptions pended to Dr. Escobar for signature and will give the patient his prescription at his appointment tomorrow.    Maci Veloz MS RN Care Coordinator

## 2019-05-06 NOTE — TELEPHONE ENCOUNTER
I called the patient's infusion center about getting Jair's AURA Virus result; They are going to fax that over to our clinic this morning.    Maci Veloz, MS RN Care Coordinator

## 2019-05-07 ENCOUNTER — ANCILLARY PROCEDURE (OUTPATIENT)
Dept: MRI IMAGING | Facility: CLINIC | Age: 34
End: 2019-05-07
Attending: PSYCHIATRY & NEUROLOGY
Payer: COMMERCIAL

## 2019-05-07 ENCOUNTER — OFFICE VISIT (OUTPATIENT)
Dept: NEUROLOGY | Facility: CLINIC | Age: 34
End: 2019-05-07
Attending: PSYCHIATRY & NEUROLOGY
Payer: COMMERCIAL

## 2019-05-07 VITALS
HEIGHT: 70 IN | SYSTOLIC BLOOD PRESSURE: 130 MMHG | BODY MASS INDEX: 22.85 KG/M2 | WEIGHT: 159.6 LBS | DIASTOLIC BLOOD PRESSURE: 81 MMHG | HEART RATE: 54 BPM

## 2019-05-07 DIAGNOSIS — G35 MULTIPLE SCLEROSIS (H): ICD-10-CM

## 2019-05-07 DIAGNOSIS — G35 MS (MULTIPLE SCLEROSIS) (H): Primary | ICD-10-CM

## 2019-05-07 PROCEDURE — G0463 HOSPITAL OUTPT CLINIC VISIT: HCPCS | Mod: ZF

## 2019-05-07 RX ORDER — DEXTROAMPHETAMINE SACCHARATE, AMPHETAMINE ASPARTATE, DEXTROAMPHETAMINE SULFATE AND AMPHETAMINE SULFATE 2.5; 2.5; 2.5; 2.5 MG/1; MG/1; MG/1; MG/1
10 TABLET ORAL 2 TIMES DAILY
Qty: 60 TABLET | Refills: 0 | Status: SHIPPED | OUTPATIENT
Start: 2019-05-07 | End: 2019-06-17

## 2019-05-07 ASSESSMENT — PAIN SCALES - GENERAL: PAINLEVEL: NO PAIN (0)

## 2019-05-07 ASSESSMENT — MIFFLIN-ST. JEOR: SCORE: 1680.19

## 2019-05-07 NOTE — TELEPHONE ENCOUNTER
Patient's AURA Virus antibody result came back negative with index 0.17.    Maci Veloz, MS RN Care Coordinator

## 2019-05-10 NOTE — PROGRESS NOTES
Service Date: 05/07/2019      REASON FOR VISIT:  Jair Sellers is a 34-year-old man who I follow for multiple sclerosis.  He returns for routine followup.  I last saw him in 02/2019.      HISTORY OF PRESENT ILLNESS:  Jair tells me he has been feeling much better than when I saw him in late February.  His fatigue is much better and he is no longer having the sensory symptoms on the left side, nearly as often.  He continues to take Tysabri infusions every 6 weeks and those are going fine.  His AURA virus antibody collected 04/24/2018 was negative, with an index of 0.17.  Since I last saw him, he has had no significant new non-neurologic medical issues.      PHYSICAL EXAMINATION:  Blood pressure 130/81, pulse 54, weight 159 pounds.  He is alert and oriented.  Affect is bright and language functions are normal.  Cranial nerves are unremarkable.  Muscle bulk, tone, strength and dexterity are normal in the arms and legs.  Light touch is intact in the hands and feet.  Deep tendon reflexes are normal and symmetric.  His gait is narrow-based and stable with normal tandem walk and negative Romberg.      We reviewed his brain MRI done earlier today and compared it to the prior exam from 2017.  Again seen are a moderate burden of T2 hyperintense lesions in the cerebral white matter with a size, shape and distribution typical for multiple sclerosis.  These include radially-oriented periventricular lesions, juxtacortical lesions in the smiley and cerebellar white matter.  There is trace corpus callosum atrophy but no other notable atrophy.  T1 disease burden is very low.  Since the prior exam, there were no new lesions.  No contrast was given.      IMPRESSION:  Relapsing-remitting multiple sclerosis, clinically and radiologically stable on natalizumab.      I spent 30 minutes with Jair, greater than 50% of which was spent in counseling and coordination of care and reviewing his MRI together.  Natalizumab is controlling his  disease very well, which is not surprising.  I reviewed the 2 new MS medications that were recently approved, siponimod and cladribine.  I explained why I would not recommend switching to them, basically probably increased risks with no reasonable expectation that they would provide better control of his MS that is currently getting with the natalizumab.  We reviewed the landscape of MS medications and how I view them.  He wanted to know more about ocrelizumab and rituximab, and we reviewed the data and the mechanism of action of those.  I told him I usually have been using rituximab as the treatment of choice for those who require more potent disease-modifying therapy, because of its combination of efficacy, safety, and ease of use.  He was intrigued at the possibility of having to get few her infusions.  However, I discussed the fact that stopping natalizumab does entail some risk of rebound relapse, and given how well his disease is controlled I would not recommend switching off that as long as his AURA virus remains negative.      PLAN:   1.  Repeat AURA virus antibody in 6 months.   2.  Return to clinic in 1 year.         DARINEL GUPTA MD             D: 05/10/2019   T: 05/10/2019   MT: KIRILL      Name:     DIEGO MARCUM   MRN:      [id number]        Account:      [id number]   :      1985           Service Date: 2019      Document: [id number]

## 2019-06-17 ENCOUNTER — MYC REFILL (OUTPATIENT)
Dept: NEUROLOGY | Facility: CLINIC | Age: 34
End: 2019-06-17

## 2019-06-17 DIAGNOSIS — G35 MULTIPLE SCLEROSIS (H): ICD-10-CM

## 2019-06-17 NOTE — TELEPHONE ENCOUNTER
Patient sent [employer] message requesting refill of their Adderall; Patient was last seen in May and has follow up appointment in May 2020 with Dr. Escobar; Pended rx to Dr. Escobar for signature and will mail to the pharmacy once signed.    Maci Veloz, MS RN Care Coordinator

## 2019-06-18 RX ORDER — DEXTROAMPHETAMINE SACCHARATE, AMPHETAMINE ASPARTATE, DEXTROAMPHETAMINE SULFATE AND AMPHETAMINE SULFATE 2.5; 2.5; 2.5; 2.5 MG/1; MG/1; MG/1; MG/1
10 TABLET ORAL 2 TIMES DAILY
Qty: 60 TABLET | Refills: 0 | Status: SHIPPED | OUTPATIENT
Start: 2019-06-18 | End: 2019-07-26

## 2019-06-18 NOTE — TELEPHONE ENCOUNTER
Rx placed in the mail to the pharmacy; [employer] message sent to patient letting him know this.    Maci Veloz MS RN Care Coordinator

## 2019-06-24 ENCOUNTER — MYC REFILL (OUTPATIENT)
Dept: NEUROLOGY | Facility: CLINIC | Age: 34
End: 2019-06-24

## 2019-06-24 DIAGNOSIS — G35 MULTIPLE SCLEROSIS (H): ICD-10-CM

## 2019-06-24 RX ORDER — DEXTROAMPHETAMINE SACCHARATE, AMPHETAMINE ASPARTATE, DEXTROAMPHETAMINE SULFATE AND AMPHETAMINE SULFATE 2.5; 2.5; 2.5; 2.5 MG/1; MG/1; MG/1; MG/1
10 TABLET ORAL 2 TIMES DAILY
Qty: 60 TABLET | Refills: 0 | Status: CANCELLED | OUTPATIENT
Start: 2019-06-24

## 2019-07-08 NOTE — TELEPHONE ENCOUNTER
Patient previously sent [employer] message asking for a new prescription, as his pharmacy only gave him a quantity of 30 for his Adderall (our prescription said 60); Jair hadn't read his [employer] message I sent him, so I gave him a call; Apparently, his pharmacy mis-counted in dispensing his Adderall and they gave him the remaining quantity of his medication.    Maci Veloz MS RN Care Coordinator

## 2019-07-26 ENCOUNTER — MYC REFILL (OUTPATIENT)
Dept: NEUROLOGY | Facility: CLINIC | Age: 34
End: 2019-07-26

## 2019-07-26 DIAGNOSIS — G35 MULTIPLE SCLEROSIS (H): ICD-10-CM

## 2019-07-26 NOTE — TELEPHONE ENCOUNTER
Requesting refill of their Adderall; Patient was last seen on 5/7/2019 and has follow up appointment on 5/5/2020 with Dr Escobar. Pended rx to Dr. Escobar for signature and will mail  to the [facility] location once signed.    Janet Eric MA

## 2019-07-29 ENCOUNTER — MYC REFILL (OUTPATIENT)
Dept: NEUROLOGY | Facility: CLINIC | Age: 34
End: 2019-07-29

## 2019-07-29 DIAGNOSIS — G35 MULTIPLE SCLEROSIS (H): ICD-10-CM

## 2019-07-29 RX ORDER — DEXTROAMPHETAMINE SACCHARATE, AMPHETAMINE ASPARTATE, DEXTROAMPHETAMINE SULFATE AND AMPHETAMINE SULFATE 2.5; 2.5; 2.5; 2.5 MG/1; MG/1; MG/1; MG/1
10 TABLET ORAL 2 TIMES DAILY
Qty: 60 TABLET | Refills: 0 | Status: CANCELLED | OUTPATIENT
Start: 2019-07-29

## 2019-07-30 RX ORDER — DEXTROAMPHETAMINE SACCHARATE, AMPHETAMINE ASPARTATE, DEXTROAMPHETAMINE SULFATE AND AMPHETAMINE SULFATE 2.5; 2.5; 2.5; 2.5 MG/1; MG/1; MG/1; MG/1
10 TABLET ORAL 2 TIMES DAILY
Qty: 60 TABLET | Refills: 0 | Status: SHIPPED | OUTPATIENT
Start: 2019-07-30 | End: 2019-08-26

## 2019-07-30 NOTE — TELEPHONE ENCOUNTER
Rx placed in the mail to the pharmacy; [employer] message sent to patient letting him know this.    Maci Veloz MS RN Care Coordinator

## 2019-08-20 ENCOUNTER — MEDICAL CORRESPONDENCE (OUTPATIENT)
Dept: HEALTH INFORMATION MANAGEMENT | Facility: CLINIC | Age: 34
End: 2019-08-20

## 2019-08-26 ENCOUNTER — MYC REFILL (OUTPATIENT)
Dept: NEUROLOGY | Facility: CLINIC | Age: 34
End: 2019-08-26

## 2019-08-26 DIAGNOSIS — G35 MULTIPLE SCLEROSIS (H): ICD-10-CM

## 2019-08-26 NOTE — TELEPHONE ENCOUNTER
Patient sent "[employer] message requesting refill of their Adderall; Patient was last seen in May and has follow up appointment in May 2020 with Dr. Escobar; Pended rx to Dr. Escobar for signature and will mail to the pharmacy once signed.    Maci Veloz, MS RN Care Coordinator

## 2019-08-27 RX ORDER — DEXTROAMPHETAMINE SACCHARATE, AMPHETAMINE ASPARTATE, DEXTROAMPHETAMINE SULFATE AND AMPHETAMINE SULFATE 2.5; 2.5; 2.5; 2.5 MG/1; MG/1; MG/1; MG/1
10 TABLET ORAL 2 TIMES DAILY
Qty: 60 TABLET | Refills: 0 | Status: SHIPPED | OUTPATIENT
Start: 2019-08-27 | End: 2019-10-07

## 2019-08-27 NOTE — TELEPHONE ENCOUNTER
Rx placed in the mail to the pharmacy; [employer] message sent to patient letting him know this.    Maci Veloz MS RN Care Coordinator

## 2019-09-28 ENCOUNTER — HEALTH MAINTENANCE LETTER (OUTPATIENT)
Age: 34
End: 2019-09-28

## 2019-10-07 ENCOUNTER — MYC REFILL (OUTPATIENT)
Dept: NEUROLOGY | Facility: CLINIC | Age: 34
End: 2019-10-07

## 2019-10-07 DIAGNOSIS — G35 MULTIPLE SCLEROSIS (H): ICD-10-CM

## 2019-10-07 NOTE — TELEPHONE ENCOUNTER
Patient sent [employer] message requesting refill of their Adderall; Patient was last seen in May and has follow up appointment in May 2020 with Dr. Escobar  Pended rx to Dr. Escobar for signature and will send electronically to the pharmacy once signed.    Maci Veloz MS RN Care Coordinator

## 2019-10-08 RX ORDER — DEXTROAMPHETAMINE SACCHARATE, AMPHETAMINE ASPARTATE, DEXTROAMPHETAMINE SULFATE AND AMPHETAMINE SULFATE 2.5; 2.5; 2.5; 2.5 MG/1; MG/1; MG/1; MG/1
10 TABLET ORAL 2 TIMES DAILY
Qty: 60 TABLET | Refills: 0 | Status: SHIPPED | OUTPATIENT
Start: 2019-10-08 | End: 2019-10-12

## 2019-10-12 ENCOUNTER — MYC REFILL (OUTPATIENT)
Dept: NEUROLOGY | Facility: CLINIC | Age: 34
End: 2019-10-12

## 2019-10-12 DIAGNOSIS — G35 MULTIPLE SCLEROSIS (H): ICD-10-CM

## 2019-10-14 NOTE — TELEPHONE ENCOUNTER
Requesting refill of their Adderall; Patient was last seen on 5/7/2019 and has follow up appointment on 5/05/2020 with Luz Pended rx to Dr Escobar for signature and will mail to [facility] pharmacy once signed.      Janet Eric MA

## 2019-10-15 RX ORDER — DEXTROAMPHETAMINE SACCHARATE, AMPHETAMINE ASPARTATE, DEXTROAMPHETAMINE SULFATE AND AMPHETAMINE SULFATE 2.5; 2.5; 2.5; 2.5 MG/1; MG/1; MG/1; MG/1
10 TABLET ORAL 2 TIMES DAILY
Qty: 60 TABLET | Refills: 0 | Status: SHIPPED | OUTPATIENT
Start: 2019-10-15 | End: 2019-11-04

## 2019-11-04 ENCOUNTER — MYC REFILL (OUTPATIENT)
Dept: NEUROLOGY | Facility: CLINIC | Age: 34
End: 2019-11-04

## 2019-11-04 DIAGNOSIS — G35 MULTIPLE SCLEROSIS (H): ICD-10-CM

## 2019-11-04 RX ORDER — DEXTROAMPHETAMINE SACCHARATE, AMPHETAMINE ASPARTATE, DEXTROAMPHETAMINE SULFATE AND AMPHETAMINE SULFATE 2.5; 2.5; 2.5; 2.5 MG/1; MG/1; MG/1; MG/1
10 TABLET ORAL 2 TIMES DAILY
Qty: 60 TABLET | Refills: 0 | Status: SHIPPED | OUTPATIENT
Start: 2019-11-04 | End: 2019-12-10

## 2019-11-04 NOTE — TELEPHONE ENCOUNTER
Requesting refill of their Adderall; Patient was last seen on 5/7/2019 and has follow up appointment on 5/5/2020 with Dr Escobar Pended rx to Luz for signature and will mail to the [facility] pharmacy once signed.    Janet Eric MA

## 2019-11-26 ENCOUNTER — TRANSFERRED RECORDS (OUTPATIENT)
Dept: HEALTH INFORMATION MANAGEMENT | Facility: CLINIC | Age: 34
End: 2019-11-26

## 2019-12-10 ENCOUNTER — MYC REFILL (OUTPATIENT)
Dept: NEUROLOGY | Facility: CLINIC | Age: 34
End: 2019-12-10

## 2019-12-10 DIAGNOSIS — G35 MULTIPLE SCLEROSIS (H): ICD-10-CM

## 2019-12-10 NOTE — TELEPHONE ENCOUNTER
Patient sent [employer] message requesting refill of their Adderall; Patient was last seen in May and has follow up appointment in May 2020 with Dr. Escobar; Pended rx to Dr. Escobar for signature and will send electronically to the pharmacy once signed.    Maci Veloz MS RN Care Coordinator

## 2019-12-11 RX ORDER — DEXTROAMPHETAMINE SACCHARATE, AMPHETAMINE ASPARTATE, DEXTROAMPHETAMINE SULFATE AND AMPHETAMINE SULFATE 2.5; 2.5; 2.5; 2.5 MG/1; MG/1; MG/1; MG/1
10 TABLET ORAL 2 TIMES DAILY
Qty: 60 TABLET | Refills: 0 | Status: SHIPPED | OUTPATIENT
Start: 2019-12-11 | End: 2020-01-02

## 2020-01-02 ENCOUNTER — MYC REFILL (OUTPATIENT)
Dept: NEUROLOGY | Facility: CLINIC | Age: 35
End: 2020-01-02

## 2020-01-02 DIAGNOSIS — G35 MULTIPLE SCLEROSIS (H): ICD-10-CM

## 2020-01-02 NOTE — TELEPHONE ENCOUNTER
Patient sent [employer] message requesting refill of their Adderall; Patient was last seen in May and has follow up appointment in May with Dr. Escobar; Pended rx to Dr. Escobar for signature and will send electronically to the pharmacy once signed.    Maci Veloz, MS RN Care Coordinator

## 2020-01-03 RX ORDER — DEXTROAMPHETAMINE SACCHARATE, AMPHETAMINE ASPARTATE, DEXTROAMPHETAMINE SULFATE AND AMPHETAMINE SULFATE 2.5; 2.5; 2.5; 2.5 MG/1; MG/1; MG/1; MG/1
10 TABLET ORAL 2 TIMES DAILY
Qty: 60 TABLET | Refills: 0 | Status: SHIPPED | OUTPATIENT
Start: 2020-01-08 | End: 2020-02-20

## 2020-02-20 ENCOUNTER — MYC REFILL (OUTPATIENT)
Dept: NEUROLOGY | Facility: CLINIC | Age: 35
End: 2020-02-20

## 2020-02-20 DIAGNOSIS — G35 MULTIPLE SCLEROSIS (H): ICD-10-CM

## 2020-02-20 NOTE — TELEPHONE ENCOUNTER
Patient sent [employer] message requesting refill of their Adderall; Patient was last seen in May and has follow up appointment in May 2020 with Dr. Escobar; Pended rx to Dr. Escobar for signature and will send electronically to the pharmacy once signed.    Maci Veloz MS RN Care Coordinator

## 2020-02-24 RX ORDER — DEXTROAMPHETAMINE SACCHARATE, AMPHETAMINE ASPARTATE, DEXTROAMPHETAMINE SULFATE AND AMPHETAMINE SULFATE 2.5; 2.5; 2.5; 2.5 MG/1; MG/1; MG/1; MG/1
10 TABLET ORAL 2 TIMES DAILY
Qty: 60 TABLET | Refills: 0 | Status: SHIPPED | OUTPATIENT
Start: 2020-02-24 | End: 2020-03-18

## 2020-03-18 ENCOUNTER — MYC REFILL (OUTPATIENT)
Dept: NEUROLOGY | Facility: CLINIC | Age: 35
End: 2020-03-18

## 2020-03-18 DIAGNOSIS — G35 MULTIPLE SCLEROSIS (H): ICD-10-CM

## 2020-03-19 RX ORDER — DEXTROAMPHETAMINE SACCHARATE, AMPHETAMINE ASPARTATE, DEXTROAMPHETAMINE SULFATE AND AMPHETAMINE SULFATE 2.5; 2.5; 2.5; 2.5 MG/1; MG/1; MG/1; MG/1
10 TABLET ORAL 2 TIMES DAILY
Qty: 60 TABLET | Refills: 0 | Status: SHIPPED | OUTPATIENT
Start: 2020-03-19 | End: 2020-04-14

## 2020-03-19 NOTE — TELEPHONE ENCOUNTER
Patient sent [email or web address] message requesting refill of their Adderall; Patient was last seen in May and has follow up appointment in May with Dr. Escobar; Pended rx to Dr. Escobar for signature and will send electronically to the pharmacy once signed.    Maci Veloz, MS RN Care Coordinator

## 2020-04-14 ENCOUNTER — MYC REFILL (OUTPATIENT)
Dept: NEUROLOGY | Facility: CLINIC | Age: 35
End: 2020-04-14

## 2020-04-14 DIAGNOSIS — G35 MULTIPLE SCLEROSIS (H): ICD-10-CM

## 2020-04-15 RX ORDER — DEXTROAMPHETAMINE SACCHARATE, AMPHETAMINE ASPARTATE, DEXTROAMPHETAMINE SULFATE AND AMPHETAMINE SULFATE 2.5; 2.5; 2.5; 2.5 MG/1; MG/1; MG/1; MG/1
10 TABLET ORAL 2 TIMES DAILY
Qty: 60 TABLET | Refills: 0 | Status: SHIPPED | OUTPATIENT
Start: 2020-04-15 | End: 2020-05-11

## 2020-04-15 NOTE — TELEPHONE ENCOUNTER
Requesting refill of their Adderall; Patient was last seen on 5/7/2020 and has follow up appointment on 5/5/2020 with . Pended rx to provider for signature and will send electronically to [facility] pharmacy once signed.    Janet Eric MA

## 2020-04-28 ENCOUNTER — MYC MEDICAL ADVICE (OUTPATIENT)
Dept: NEUROLOGY | Facility: CLINIC | Age: 35
End: 2020-04-28

## 2020-04-28 DIAGNOSIS — G35 MULTIPLE SCLEROSIS (H): Primary | ICD-10-CM

## 2020-04-28 NOTE — TELEPHONE ENCOUNTER
Dr. Escobar, please see patient's MyChart message; Are you okay with him rescheduling with you for a later date? He is also due to have his AURA Virus antibody checked; Would you like that ordered, then I can fax it to his infusion center to be drawn with his next Tysabri dose? What do you think about the antibody test he is asking about? Thank you.    Maci Veloz, MS RN Care Coordinator

## 2020-04-28 NOTE — TELEPHONE ENCOUNTER
1.  Fine to reschedule  2.  I'll order JCV Ab  3.  I don't know about current availability of antibody testing - he could start with [facility], maybe they know more

## 2020-05-05 NOTE — TELEPHONE ENCOUNTER
Patient's next infusion appointment is on 5/27/20; Will fax to [facility] (phone [phone] fax [phone]) on Thursday when I am in the clinic; Patient's appointment needs to be rescheduled, so I will notify our medical assistant, Janet.    Maci Veloz, MS RN Care Coordinator

## 2020-05-11 ENCOUNTER — MYC REFILL (OUTPATIENT)
Dept: NEUROLOGY | Facility: CLINIC | Age: 35
End: 2020-05-11

## 2020-05-11 DIAGNOSIS — G35 MULTIPLE SCLEROSIS (H): ICD-10-CM

## 2020-05-12 DIAGNOSIS — G35 MULTIPLE SCLEROSIS (H): ICD-10-CM

## 2020-05-12 NOTE — TELEPHONE ENCOUNTER
Rx Authorization:    Requested Medication/ Dose: Sertraline 50mg    Date last refill ordered: 5/7/2019    Quantity ordered: 30    # refills: 11    Date of last clinic visit with ordering provider: 5/7/2019    Date of next clinic visit with ordering provider: none    All pertinent protocol data (lab date/result):     Include pertinent information from patients message:

## 2020-05-13 RX ORDER — DEXTROAMPHETAMINE SACCHARATE, AMPHETAMINE ASPARTATE, DEXTROAMPHETAMINE SULFATE AND AMPHETAMINE SULFATE 2.5; 2.5; 2.5; 2.5 MG/1; MG/1; MG/1; MG/1
10 TABLET ORAL 2 TIMES DAILY
Qty: 60 TABLET | Refills: 0 | Status: SHIPPED | OUTPATIENT
Start: 2020-05-13 | End: 2020-06-15

## 2020-05-18 DIAGNOSIS — G35 MULTIPLE SCLEROSIS (H): ICD-10-CM

## 2020-05-18 DIAGNOSIS — G37.9 DEMYELINATING DISEASE OF CENTRAL NERVOUS SYSTEM (H): ICD-10-CM

## 2020-05-20 LAB
COVID-19 SPIKE RBD ABY TITER: NORMAL
COVID-19 SPIKE RBD ABY: POSITIVE

## 2020-05-22 ENCOUNTER — TELEPHONE (OUTPATIENT)
Dept: NEUROLOGY | Facility: CLINIC | Age: 35
End: 2020-05-22

## 2020-05-22 NOTE — TELEPHONE ENCOUNTER
"I spoke with Jair today about his Covid-19 antibody test, which was positive (1:800).  He had requested this because he (and his wife) had an illness in March they thought was consistent with Covid-19, and he does not have a primary care provider through which he could request testing.  I though testing was warranted as he is on an immunosuppressive medication (natalizumab) for MS, and was curious to know if patients on that medication could mount an effective immune response.    Briefly, he went with family to [place] in early March.  He notes that a cousin from [place] was ill with what he thought was a sinus infection.  After they returned, his wife became ill, with what was also considered sinus infection (by PCP, and later ED visit- - she was given two courses of antibiotics.  She had loss of taste and smell, and sore throat, sinus pressure, but no respiratory problems and no known fever.  In mid-late March, Jair developed intermittent fevers (measured at 103) and myalgies - no respiratory problems, no anosmia or dysguesia.  His symptoms lasted about 48 hours and then resolved.  They have both long-since recovered to baseline, and their children were never ill.    We discussed the limited interpretation we can make from this result.  Given his prior symptoms and the positive antibody test, I told him I conclude that he had Covid-19 (he asked me to note that he \"kicked its ass in 48 hours\").  We do not know whether this positive antibody means he is immune to re-infection.  "

## 2020-05-27 LAB — LAB SCANNED RESULT: ABNORMAL

## 2020-06-15 ENCOUNTER — APPOINTMENT (OUTPATIENT)
Age: 35
Setting detail: DERMATOLOGY
End: 2020-06-15

## 2020-06-15 ENCOUNTER — MYC REFILL (OUTPATIENT)
Dept: NEUROLOGY | Facility: CLINIC | Age: 35
End: 2020-06-15

## 2020-06-15 VITALS — HEIGHT: 70 IN | RESPIRATION RATE: 18 BRPM | WEIGHT: 155 LBS

## 2020-06-15 DIAGNOSIS — G35 MULTIPLE SCLEROSIS (H): ICD-10-CM

## 2020-06-15 DIAGNOSIS — L63.8 OTHER ALOPECIA AREATA: ICD-10-CM

## 2020-06-15 PROCEDURE — OTHER INTRALESIONAL KENALOG: OTHER

## 2020-06-15 PROCEDURE — OTHER ADDITIONAL NOTES: OTHER

## 2020-06-15 PROCEDURE — 11900 INJECT SKIN LESIONS </W 7: CPT

## 2020-06-15 PROCEDURE — OTHER COUNSELING: OTHER

## 2020-06-15 PROCEDURE — OTHER PRESCRIPTION: OTHER

## 2020-06-15 RX ORDER — DEXTROAMPHETAMINE SACCHARATE, AMPHETAMINE ASPARTATE, DEXTROAMPHETAMINE SULFATE AND AMPHETAMINE SULFATE 2.5; 2.5; 2.5; 2.5 MG/1; MG/1; MG/1; MG/1
10 TABLET ORAL 2 TIMES DAILY
Qty: 60 TABLET | Refills: 0 | Status: SHIPPED | OUTPATIENT
Start: 2020-06-15 | End: 2020-07-13

## 2020-06-15 RX ORDER — CLOBETASOL PROPIONATE 0.5 MG/ML
0.05% SOLUTION TOPICAL QHS
Qty: 1 | Refills: 1 | Status: ERX | COMMUNITY
Start: 2020-06-15

## 2020-06-15 ASSESSMENT — LOCATION ZONE DERM
LOCATION ZONE: SCALP
LOCATION ZONE: FACE

## 2020-06-15 ASSESSMENT — LOCATION DETAILED DESCRIPTION DERM
LOCATION DETAILED: LEFT CENTRAL EYEBROW
LOCATION DETAILED: RIGHT OCCIPITAL SCALP

## 2020-06-15 ASSESSMENT — LOCATION SIMPLE DESCRIPTION DERM
LOCATION SIMPLE: LEFT EYEBROW
LOCATION SIMPLE: POSTERIOR SCALP

## 2020-07-07 ENCOUNTER — TELEPHONE (OUTPATIENT)
Dept: NEUROLOGY | Facility: CLINIC | Age: 35
End: 2020-07-07

## 2020-07-07 RX ORDER — HEPARIN SODIUM,PORCINE 10 UNIT/ML
5 VIAL (ML) INTRAVENOUS
Status: CANCELLED | OUTPATIENT
Start: 2020-07-07

## 2020-07-07 RX ORDER — HEPARIN SODIUM (PORCINE) LOCK FLUSH IV SOLN 100 UNIT/ML 100 UNIT/ML
5 SOLUTION INTRAVENOUS
Status: CANCELLED | OUTPATIENT
Start: 2020-07-07

## 2020-07-07 NOTE — TELEPHONE ENCOUNTER
Tysabri orders signed by Dr. Escobar; Janet, please fax to infusion center at fax number listed in my note; Thank you.    Maci Veloz, MS RN Care Coordinator

## 2020-07-07 NOTE — TELEPHONE ENCOUNTER
Patient is scheduled for a Tysabri infusion tomorrow and is need of updated infusion orders; I talked with the infusion center, and they asked that an order for the AURA Virus be added in to those orders to be drawn every 6 months; New Tysabri therapy plan orders placed; Will fax to [facility] (phone [phone] fax [phone]) once signed by Dr. Escobar.    Maci Veloz, MS RN Care Coordinator

## 2020-07-13 ENCOUNTER — MYC REFILL (OUTPATIENT)
Dept: NEUROLOGY | Facility: CLINIC | Age: 35
End: 2020-07-13

## 2020-07-13 DIAGNOSIS — G35 MULTIPLE SCLEROSIS (H): ICD-10-CM

## 2020-07-13 RX ORDER — DEXTROAMPHETAMINE SACCHARATE, AMPHETAMINE ASPARTATE, DEXTROAMPHETAMINE SULFATE AND AMPHETAMINE SULFATE 2.5; 2.5; 2.5; 2.5 MG/1; MG/1; MG/1; MG/1
10 TABLET ORAL 2 TIMES DAILY
Qty: 60 TABLET | Refills: 0 | Status: SHIPPED | OUTPATIENT
Start: 2020-07-13 | End: 2020-08-13

## 2020-07-13 NOTE — TELEPHONE ENCOUNTER
Patient sent [employer] message requesting refill of their Adderall; Patient was last seen in May 2019 and has no follow up appointment with Dr. Escobar; Pended rx to Dr. Escobar for signature and will send electronically to the pharmacy once signed.    Maci Veloz, MS RN Care Coordinator

## 2020-08-12 ENCOUNTER — APPOINTMENT (OUTPATIENT)
Dept: URBAN - METROPOLITAN AREA CLINIC 252 | Age: 35
Setting detail: DERMATOLOGY
End: 2020-08-17

## 2020-08-12 VITALS — HEIGHT: 70 IN | RESPIRATION RATE: 16 BRPM | WEIGHT: 155 LBS

## 2020-08-12 DIAGNOSIS — D485 NEOPLASM OF UNCERTAIN BEHAVIOR OF SKIN: ICD-10-CM

## 2020-08-12 DIAGNOSIS — L63.8 OTHER ALOPECIA AREATA: ICD-10-CM

## 2020-08-12 DIAGNOSIS — D22 MELANOCYTIC NEVI: ICD-10-CM

## 2020-08-12 PROBLEM — D48.5 NEOPLASM OF UNCERTAIN BEHAVIOR OF SKIN: Status: ACTIVE | Noted: 2020-08-12

## 2020-08-12 PROBLEM — D22.5 MELANOCYTIC NEVI OF TRUNK: Status: ACTIVE | Noted: 2020-08-12

## 2020-08-12 PROCEDURE — OTHER COUNSELING: OTHER

## 2020-08-12 PROCEDURE — 11102 TANGNTL BX SKIN SINGLE LES: CPT

## 2020-08-12 PROCEDURE — OTHER BIOPSY BY SHAVE METHOD: OTHER

## 2020-08-12 PROCEDURE — 99214 OFFICE O/P EST MOD 30 MIN: CPT | Mod: 25

## 2020-08-12 PROCEDURE — OTHER PATHOLOGY BILLING: OTHER

## 2020-08-12 PROCEDURE — 88305 TISSUE EXAM BY PATHOLOGIST: CPT

## 2020-08-12 ASSESSMENT — LOCATION SIMPLE DESCRIPTION DERM
LOCATION SIMPLE: ABDOMEN
LOCATION SIMPLE: LEFT UPPER BACK
LOCATION SIMPLE: SCALP
LOCATION SIMPLE: GROIN
LOCATION SIMPLE: CHEST

## 2020-08-12 ASSESSMENT — LOCATION DETAILED DESCRIPTION DERM
LOCATION DETAILED: LEFT SUPERIOR MEDIAL UPPER BACK
LOCATION DETAILED: LEFT MEDIAL SUPERIOR CHEST
LOCATION DETAILED: SUPRAPUBIC SKIN
LOCATION DETAILED: LEFT SUPERIOR PARIETAL SCALP
LOCATION DETAILED: RIGHT LATERAL ABDOMEN

## 2020-08-12 ASSESSMENT — LOCATION ZONE DERM
LOCATION ZONE: TRUNK
LOCATION ZONE: SCALP

## 2020-08-12 NOTE — PROCEDURE: COUNSELING
Detail Level: Simple
Patient Specific Counseling (Will Not Stick From Patient To Patient): Patient recently had Kenalog iL injections performed last month as states that it was effective this so far
Detail Level: Detailed

## 2020-08-12 NOTE — PROCEDURE: BIOPSY BY SHAVE METHOD
Hide Biopsy Depth?: No
Silver Nitrate Text: The wound bed was treated with silver nitrate after the biopsy was performed.
Hemostasis: Drysol
Dressing: bandage
Electrodesiccation And Curettage Text: The wound bed was treated with electrodesiccation and curettage after the biopsy was performed.
Validate Note Data (See Information Below): Yes
Billing Type: Client Bill
X Size Of Lesion In Cm: 0
Type Of Destruction Used: Curettage
Biopsy Type: H and E
Electrodesiccation Text: The wound bed was treated with electrodesiccation after the biopsy was performed.
Consent: Written consent was obtained and risks were reviewed including but not limited to scarring, infection, bleeding, scabbing, incomplete removal, nerve damage and allergy to anesthesia.
Anesthesia Volume In Cc (Will Not Render If 0): 0.5
Cryotherapy Text: The wound bed was treated with cryotherapy after the biopsy was performed.
Anesthesia Type: 1% lidocaine with epinephrine
Detail Level: Detailed
Wound Care: Petrolatum
Information: Selecting Yes will display possible errors in your note based on the variables you have selected. This validation is only offered as a suggestion for you. PLEASE NOTE THAT THE VALIDATION TEXT WILL BE REMOVED WHEN YOU FINALIZE YOUR NOTE. IF YOU WANT TO FAX A PRELIMINARY NOTE YOU WILL NEED TO TOGGLE THIS TO 'NO' IF YOU DO NOT WANT IT IN YOUR FAXED NOTE.
Curettage Text: The wound bed was treated with curettage after the biopsy was performed.
Biopsy Method: Dermablade
Depth Of Biopsy: dermis
Notification Instructions: Patient will be notified of biopsy results. However, patient instructed to call the office if not contacted within 2 weeks.
Post-Care Instructions: I reviewed with the patient in detail post-care instructions. Patient is to keep the biopsy site dry overnight, and then apply bacitracin twice daily until healed. Patient may apply hydrogen peroxide soaks to remove any crusting.

## 2020-08-12 NOTE — PROCEDURE: PATHOLOGY BILLING
Immunohistochemistry (76813 and 83232) billing is not performed here. Please use the Immunohistochemistry Stain Billing plan to accomplish this. Immunohistochemistry (38719 and 67968) billing is not performed here. Please use the Immunohistochemistry Stain Billing plan to accomplish this.

## 2020-08-13 ENCOUNTER — MYC REFILL (OUTPATIENT)
Dept: NEUROLOGY | Facility: CLINIC | Age: 35
End: 2020-08-13

## 2020-08-13 DIAGNOSIS — G35 MULTIPLE SCLEROSIS (H): ICD-10-CM

## 2020-08-14 NOTE — TELEPHONE ENCOUNTER
Patient sent [employer] message requesting refill of their Adderall; Patient was last seen in May 2019 and has follow up appointment in September with Dr. Escobar. Pended rx to Dr. Escobar for signature and will send electronically to the pharmacy once signed.    Susan LIU

## 2020-08-17 RX ORDER — DEXTROAMPHETAMINE SACCHARATE, AMPHETAMINE ASPARTATE, DEXTROAMPHETAMINE SULFATE AND AMPHETAMINE SULFATE 2.5; 2.5; 2.5; 2.5 MG/1; MG/1; MG/1; MG/1
10 TABLET ORAL 2 TIMES DAILY
Qty: 60 TABLET | Refills: 0 | Status: SHIPPED | OUTPATIENT
Start: 2020-08-17 | End: 2020-09-11

## 2020-09-01 ENCOUNTER — OFFICE VISIT (OUTPATIENT)
Dept: NEUROLOGY | Facility: CLINIC | Age: 35
End: 2020-09-01
Attending: PSYCHIATRY & NEUROLOGY
Payer: COMMERCIAL

## 2020-09-01 VITALS — SYSTOLIC BLOOD PRESSURE: 131 MMHG | HEART RATE: 60 BPM | DIASTOLIC BLOOD PRESSURE: 74 MMHG | OXYGEN SATURATION: 99 %

## 2020-09-01 DIAGNOSIS — G35 MULTIPLE SCLEROSIS (H): ICD-10-CM

## 2020-09-01 DIAGNOSIS — G35 MULTIPLE SCLEROSIS (H): Primary | ICD-10-CM

## 2020-09-01 PROCEDURE — G0463 HOSPITAL OUTPT CLINIC VISIT: HCPCS | Mod: ZF

## 2020-09-01 PROCEDURE — 36415 COLL VENOUS BLD VENIPUNCTURE: CPT | Performed by: PSYCHIATRY & NEUROLOGY

## 2020-09-01 PROCEDURE — 40000975 ZZHCL STATISTIC JC VIR AB INDEX INHIB: Performed by: PSYCHIATRY & NEUROLOGY

## 2020-09-01 ASSESSMENT — PAIN SCALES - GENERAL: PAINLEVEL: NO PAIN (0)

## 2020-09-01 ASSESSMENT — PATIENT HEALTH QUESTIONNAIRE - PHQ9: SUM OF ALL RESPONSES TO PHQ QUESTIONS 1-9: 0

## 2020-09-01 NOTE — LETTER
9/1/2020       RE: Real Sellers  [address] [facility] [address]     Dear Colleague,    Thank you for referring your patient, Real Sellers, to the [facility] MULTIPLE SCLEROSIS at [facility]. Please see a copy of my visit note below.    Service Date: 09/01/2020      REASON FOR VISIT:  Jair Sellers is a 35-year-old man who I follow for multiple sclerosis.  He returns for regular followup.  I last saw him in May of last year.      HISTORY OF PRESENT ILLNESS:  Jair has been doing well neurologically.  He tested positive for COVID-19 antibodies in May, with a febrile illness in March.  He tells me he has since donated plasma twice and had positive antibodies each time.  He had a few episodes this winter where his fatigue would be much worse, but basically he has been doing great from a symptomatic perspective.  He has been taking Adderall 5 mg twice a day and skipping it on the weekends.  He does notice he is a bit more tired on the weekends, not surprisingly.  His AURA virus antibody was tested in May and was indeterminate with an index of 0.32.  The inhibition assay resulted in an ultimate negative result.      PHYSICAL EXAMINATION:   VITAL SIGNS:  Blood pressure 131/74.  Pulse 60.  Respiratory rate 12.   NEUROLOGIC:  He is alert and oriented.  Affect is bright and language functions are normal.  Cranial nerves are unremarkable.  Muscle bulk, tone, strength and dexterity are normal in the arms and legs.  Light touch is intact in all 4 limbs, and deep tendon reflexes are normal and symmetric.  His gait is narrow-based and stable with mildly impaired tandem walk and negative Romberg.      IMPRESSION:  Relapsing-remitting multiple sclerosis, stable on natalizumab.  I spent 17 minutes with Jair, greater than 50% of which was spent in counseling and coordination of care.  We discussed how his AURA virus antibody titers seem to have been gradually  rising over the past year or so, even if his inhibition assays have remained negative.  We discussed the plan if this should turn positive.  We would retest in about 2-3 months, and if it remained positive, we would switch to a different medication, most likely B-cell depletion.      PLAN:   1.  AURA virus antibodies in a couple of months at his next infusion.   2.  Follow up in 1 year.         YOUSUF GUPTA MD             D: 2020   T: 2020   MT: AURELIA      Name:     DIEGO MARCUM   MRN:      [id number]        Account:      [id number]   :      1985           Service Date: 2020      Document: [id number]        Again, thank you for allowing me to participate in the care of your patient.      Sincerely,    Yousuf Gupta MD

## 2020-09-01 NOTE — PROGRESS NOTES
Service Date: 09/01/2020      REASON FOR VISIT:  Jair Sellers is a 35-year-old man who I follow for multiple sclerosis.  He returns for regular followup.  I last saw him in May of last year.      HISTORY OF PRESENT ILLNESS:  Jair has been doing well neurologically.  He tested positive for COVID-19 antibodies in May, with a febrile illness in March.  He tells me he has since donated plasma twice and had positive antibodies each time.  He had a few episodes this winter where his fatigue would be much worse, but basically he has been doing great from a symptomatic perspective.  He has been taking Adderall 5 mg twice a day and skipping it on the weekends.  He does notice he is a bit more tired on the weekends, not surprisingly.  His AURA virus antibody was tested in May and was indeterminate with an index of 0.32.  The inhibition assay resulted in an ultimate negative result.      PHYSICAL EXAMINATION:   VITAL SIGNS:  Blood pressure 131/74.  Pulse 60.  Respiratory rate 12.   NEUROLOGIC:  He is alert and oriented.  Affect is bright and language functions are normal.  Cranial nerves are unremarkable.  Muscle bulk, tone, strength and dexterity are normal in the arms and legs.  Light touch is intact in all 4 limbs, and deep tendon reflexes are normal and symmetric.  His gait is narrow-based and stable with mildly impaired tandem walk and negative Romberg.      IMPRESSION:  Relapsing-remitting multiple sclerosis, stable on natalizumab.  I spent 17 minutes with Jair, greater than 50% of which was spent in counseling and coordination of care.  We discussed how his AURA virus antibody titers seem to have been gradually rising over the past year or so, even if his inhibition assays have remained negative.  We discussed the plan if this should turn positive.  We would retest in about 2-3 months, and if it remained positive, we would switch to a different medication, most likely B-cell depletion.      PLAN:   1.  AURA virus  antibodies in a couple of months at his next infusion.   2.  Follow up in 1 year.         DARINEL GUPTA MD             D: 2020   T: 2020   MT: AURELIA      Name:     DIEGO MARCUM   MRN:      [id number]        Account:      [id number]   :      1985           Service Date: 2020      Document: [id number]

## 2020-09-01 NOTE — LETTER
9/1/2020       RE: Real Sellers  [address] [facility] [address]     Dear Colleague,    Thank you for referring your patient, Real Sellers, to the [facility] MULTIPLE SCLEROSIS at [facility]. Please see a copy of my visit note below.    Service Date: 09/01/2020      REASON FOR VISIT:  Jair Sellers is a 35-year-old man who I follow for multiple sclerosis.  He returns for regular followup.  I last saw him in May of last year.      HISTORY OF PRESENT ILLNESS:  Jair has been doing well neurologically.  He tested positive for COVID-19 antibodies in May, with a febrile illness in March.  He tells me he has since donated plasma twice and had positive antibodies each time.  He had a few episodes this winter where his fatigue would be much worse, but basically he has been doing great from a symptomatic perspective.  He has been taking Adderall 5 mg twice a day and skipping it on the weekends.  He does notice he is a bit more tired on the weekends, not surprisingly.  His AURA virus antibody was tested in May and was indeterminate with an index of 0.32.  The inhibition assay resulted in an ultimate negative result.      PHYSICAL EXAMINATION:   VITAL SIGNS:  Blood pressure 131/74.  Pulse 60.  Respiratory rate 12.   NEUROLOGIC:  He is alert and oriented.  Affect is bright and language functions are normal.  Cranial nerves are unremarkable.  Muscle bulk, tone, strength and dexterity are normal in the arms and legs.  Light touch is intact in all 4 limbs, and deep tendon reflexes are normal and symmetric.  His gait is narrow-based and stable with mildly impaired tandem walk and negative Romberg.      IMPRESSION:  Relapsing-remitting multiple sclerosis, stable on natalizumab.  I spent 17 minutes with Jair, greater than 50% of which was spent in counseling and coordination of care.  We discussed how his AURA virus antibody titers seem to have been gradually  rising over the past year or so, even if his inhibition assays have remained negative.  We discussed the plan if this should turn positive.  We would retest in about 2-3 months, and if it remained positive, we would switch to a different medication, most likely B-cell depletion.        PLAN:   1.  AURA virus antibodies in a couple of months at his next infusion.   2.  Follow up in 1 year.      Again, thank you for allowing me to participate in the care of your patient.  Sincerely,     DARINEL GUPTA MD  D: 2020   T: 2020   MT: AURELIA      Name:     DIEGO MARCUM   MRN:      [id number]        Account:      [id number]   :      1985           Service Date: 2020      Document: [id number]

## 2020-09-08 LAB — LAB SCANNED RESULT: ABNORMAL

## 2020-09-11 ENCOUNTER — MYC REFILL (OUTPATIENT)
Dept: NEUROLOGY | Facility: CLINIC | Age: 35
End: 2020-09-11

## 2020-09-11 ENCOUNTER — MYC MEDICAL ADVICE (OUTPATIENT)
Dept: NEUROLOGY | Facility: CLINIC | Age: 35
End: 2020-09-11

## 2020-09-11 DIAGNOSIS — G35 MULTIPLE SCLEROSIS (H): ICD-10-CM

## 2020-09-11 DIAGNOSIS — G35 MULTIPLE SCLEROSIS (H): Primary | ICD-10-CM

## 2020-09-11 NOTE — TELEPHONE ENCOUNTER
I updated the patient that his most recent JCV testing is positive. Per Dr. Escobar, will recheck in 2 months. Order placed by Dr. Escobar.

## 2020-09-14 RX ORDER — DEXTROAMPHETAMINE SACCHARATE, AMPHETAMINE ASPARTATE, DEXTROAMPHETAMINE SULFATE AND AMPHETAMINE SULFATE 2.5; 2.5; 2.5; 2.5 MG/1; MG/1; MG/1; MG/1
10 TABLET ORAL 2 TIMES DAILY
Qty: 60 TABLET | Refills: 0 | Status: SHIPPED | OUTPATIENT
Start: 2020-09-14 | End: 2020-10-19

## 2020-09-14 NOTE — TELEPHONE ENCOUNTER
Patient sent [employer] message requesting refill of their Adderall; Patient was last seen in September and has follow up appointment in September 2021 with Dr. Escobar; Pended rx to Dr. Escobar for signature and will send electronically to the pharmacy once signed.    Maci Veloz MS RN Care Coordinator

## 2020-09-18 NOTE — TELEPHONE ENCOUNTER
Dr. Escobar, please see Jair's My Chart message. I will let him know the result was negative, but he wants to know the difference between Ocrevus and Tysabri. Thank you.    Digna Rob, MS RN Care Coordinator     96

## 2020-10-19 ENCOUNTER — MYC REFILL (OUTPATIENT)
Dept: NEUROLOGY | Facility: CLINIC | Age: 35
End: 2020-10-19

## 2020-10-19 DIAGNOSIS — G35 MULTIPLE SCLEROSIS (H): ICD-10-CM

## 2020-10-19 RX ORDER — DEXTROAMPHETAMINE SACCHARATE, AMPHETAMINE ASPARTATE, DEXTROAMPHETAMINE SULFATE AND AMPHETAMINE SULFATE 2.5; 2.5; 2.5; 2.5 MG/1; MG/1; MG/1; MG/1
10 TABLET ORAL 2 TIMES DAILY
Qty: 60 TABLET | Refills: 0 | Status: SHIPPED | OUTPATIENT
Start: 2020-10-19 | End: 2020-11-12

## 2020-10-19 NOTE — TELEPHONE ENCOUNTER
Patient sent [employer] message requesting refill of their Adderall; Patient was last seen in September and has follow up appointment in September 2021 with Dr. Escobar; Pended rx to Dr. Escobar for signature and will send electronically to the pharmacy once signed.    Maci Veloz MS RN Care Coordinator

## 2020-11-02 ENCOUNTER — TELEPHONE (OUTPATIENT)
Dept: NEUROLOGY | Facility: CLINIC | Age: 35
End: 2020-11-02

## 2020-11-02 DIAGNOSIS — G35 MULTIPLE SCLEROSIS (H): Primary | ICD-10-CM

## 2020-11-12 ENCOUNTER — MYC REFILL (OUTPATIENT)
Dept: NEUROLOGY | Facility: CLINIC | Age: 35
End: 2020-11-12

## 2020-11-12 DIAGNOSIS — G35 MULTIPLE SCLEROSIS (H): ICD-10-CM

## 2020-11-12 RX ORDER — DEXTROAMPHETAMINE SACCHARATE, AMPHETAMINE ASPARTATE, DEXTROAMPHETAMINE SULFATE AND AMPHETAMINE SULFATE 2.5; 2.5; 2.5; 2.5 MG/1; MG/1; MG/1; MG/1
10 TABLET ORAL 2 TIMES DAILY
Qty: 60 TABLET | Refills: 0 | Status: SHIPPED | OUTPATIENT
Start: 2020-11-12 | End: 2020-12-16

## 2020-11-12 NOTE — TELEPHONE ENCOUNTER
Patient sent [employer] message requesting refill of their Adderall; Patient was last seen in September and has follow up appointment in September 2021 with Dr. Escobar; Pended rx to Dr. Escobar for signature and will send electronically to the pharmacy once signed.    Maci Veloz MS RN Care Coordinator

## 2020-11-19 ENCOUNTER — TRANSFERRED RECORDS (OUTPATIENT)
Dept: HEALTH INFORMATION MANAGEMENT | Facility: CLINIC | Age: 35
End: 2020-11-19

## 2020-12-03 NOTE — TELEPHONE ENCOUNTER
Patient's AURA Virus antibody lab results received and scanned to the media tab; Will route to Dr. Escobar for review and input/plan.    Maci Veloz, MS RN Care Coordinator

## 2020-12-04 NOTE — TELEPHONE ENCOUNTER
"AURA virus antibody was low-positive (0.42, with 0.4 being the cutoff for a \"positive\" result).  This isn't a crisis, but we should have it re-checked in 2 months.  If still positive, then at that point we would switch him to a different MS medication.  Test ordered.  "

## 2020-12-07 NOTE — TELEPHONE ENCOUNTER
I suggest we switch him to rituximab.  If he'd like to discuss it, we can set up an appointment with me or Aimee to do so, or if he's OK with it we can just start the process.  I will order baseline labs.  No need to repeat AURA virus Ab.   Lab called and stated 0500 chem sample has hemolyzed. MD paged regarding results. MD stated to redraw sample.

## 2020-12-07 NOTE — TELEPHONE ENCOUNTER
[employer] message sent to patient; Will wait to hear back from him.    Maci Veloz, MS RN Care Coordinator

## 2020-12-08 NOTE — TELEPHONE ENCOUNTER
Patient prefers to discuss the medication with Aimee; Patient is scheduled with her for this Thursday.    Mcai Veloz, MS RN Care Coordinator

## 2020-12-10 ENCOUNTER — VIRTUAL VISIT (OUTPATIENT)
Dept: NEUROLOGY | Facility: CLINIC | Age: 35
End: 2020-12-10
Attending: PSYCHIATRY & NEUROLOGY
Payer: COMMERCIAL

## 2020-12-10 DIAGNOSIS — G35 MULTIPLE SCLEROSIS (H): Primary | ICD-10-CM

## 2020-12-10 DIAGNOSIS — R76.8 JC VIRUS ANTIBODY POSITIVE: ICD-10-CM

## 2020-12-10 DIAGNOSIS — E55.9 VITAMIN D DEFICIENCY: ICD-10-CM

## 2020-12-10 PROCEDURE — 99213 OFFICE O/P EST LOW 20 MIN: CPT | Mod: GT | Performed by: NURSE PRACTITIONER

## 2020-12-10 NOTE — PROGRESS NOTES
"Real Sellers is a 35 year old male who is being evaluated via a billable video visit.      The patient has been notified of following:     \"This video visit will be conducted via a call between you and your physician/provider. We have found that certain health care needs can be provided without the need for an in-person physical exam.  This service lets us provide the care you need with a video conversation.  If a prescription is necessary we can send it directly to your pharmacy.  If lab work is needed we can place an order for that and you can then stop by our lab to have the test done at a later time.    Video visits are billed at different rates depending on your insurance coverage.  Please reach out to your insurance provider with any questions.    If during the course of the call the physician/provider feels a video visit is not appropriate, you will not be charged for this service.\"    Patient has given verbal consent for Video visit? Yes  How would you like to obtain your AVS? [employer]  If you are dropped from the video visit, the video invite should be resent to: Other e-mail: [email or web address]  Will anyone else be joining your video visit? No        Video-Visit Details    Type of service:  Video Visit    Video Start Time: 1.03  Video End Time: 1.52    Originating Location (pt. Location): Home    Distant Location (provider location):  [facility] MULTIPLE [facility]CL[facility] CLINIC [place]     Platform used for Video Visit: "[employer]    [facility] MS CLINIC VIRTUAL VISIT     ** Due to the recommendations of public health authorities, previously scheduled office visit was converted to a video visit visit to limit unnecessary exposure in light of the COVID-19 pandemic**      Jair is a 35 year old male who is being evaluated via a billable video visit on 12/10 for the diagnosis of Relapsing Remitting MS.      HISTORY OF PRESENT ILLNESS:   Please refer to previous notes for details. In brief, " aJir was diagnosed with Multiple Sclerosis at the age of 18. At that time he had presented with significant dizziness, vertigo and other vestibular symptoms attributable to cerebellar pathology. He was started on Copaxone and continued that medication until 2009. Due to inadequate disease control, treatment was switched to Rebif which he continued till 2013. Due to mood issues, he was started on Tecfidera.  In March 2017 patient had a spinal relapse and treatment was switched to Tysabri.  He was found to be AURA virus positive recently and a treatment change to rituximab was recommended by Dr. Escobar.          INTERVAL HISTORY SINCE LAST VISIT: Most recent visit with Dr. Escobar on 09/01/2020.  Prior to that in May, patient tested positive for COVID-19 antibodies with a febrile illness in March.  Overall doing well since then. No recent hospitalization or illness. Patient denies any new changes in vision, balance, strength or sensation suggestive of new relapse of multiple sclerosis since he was last seen here.    He is currently on Tysabri every 6 weeks and his most recent infusion was on 11/19 and the next infusion is scheduled for 12/23.  He tolerates these infusions well without any side effects.      He reports occasional numbness in his left leg as a residual symptom.  Also has some bladder urgency and frequency.  For MS related fatigue he currently uses Adderall 5 mg twice a day.    Past medical/surgical history:  1. Relapsing remitting multiple sclerosis  No surgeries in the past    Family history:  Negative for multiple sclerosis.    Social history:  He is  with 2 children ages 3 and 5.  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  He denies smoking and recreational drug use.  Reports alcohol use on the weekends.      Vit D: 10,000 international units daily during winter months.      5/2019 MRI Brain:  IMPRESSION: Little change in greater than 20 T2 hyperintense white  matter lesions compatible with multiple  sclerosis.    PHYSICAL EXAM: N/A (video visit)     ASSESSMENT/ PLAN:   1.  Relapsing remitting multiple sclerosis, on Tysabri with positive AURA virus serology: Patient was diagnosed with MS at the age of 18. Previously treated with Copaxone (inadequate disease control), on Rebif from 2009 until 2013 (had mood issues), on Tecfidera from 2013 till March 2017 (had a spinal relapse).  On Tysabri since 2017 and patient had 2 AURA virus positive serology recently.     Treatment change to rituximab was recommended by Dr. Escobar and we discussed this medication in detail.  Patient to complete labs required for rituximab at his convenience.    Per staff message from Dr. Escobar, he called the patient after this virtual visit. Due to the imminent availability of Covid vaccines, they discussed timing of initial rituximab infusion.  2 options (starting rituximab now versus continuing Tysabri until he is vaccinated for COVID-19 and then switch to rituximab) were discussed with the patient.  Patient will discuss this with his family and will notify us.  Of note patient was tested positive for COVID-19 antibodies in May 2020.    Per Dr. Escobar patient to get 1250 mg prednisone po qday x 3 days)  9 weeks after stopping Tysabri to prevent rebound relapse.  No need for a baseline MRI at this time.    Patient to follow-up with Dr. Escobar as scheduled in September 2021.  He will also need a follow-up MRI prior.      2.  Vitamin D supplementation: He recently started using 10,000 international units daily during winter months.    3. Please contact us with questions or concerns.     Aimee Pierre CNP  [facility] Neurology

## 2020-12-10 NOTE — LETTER
"12/10/2020       RE: Real Sellers  [address]  [place] [facility] [facility] [address]     Dear Colleague,    Thank you for referring your patient, Real Sellers, to the [facility] MULTIPLE SCLEROSIS [facility] at [facility]. Please see a copy of my visit note below.    Real Sellers is a 35 year old male who is being evaluated via a billable video visit.      The patient has been notified of following:     \"This video visit will be conducted via a call between you and your physician/provider. We have found that certain health care needs can be provided without the need for an in-person physical exam.  This service lets us provide the care you need with a video conversation.  If a prescription is necessary we can send it directly to your pharmacy.  If lab work is needed we can place an order for that and you can then stop by our lab to have the test done at a later time.    Video visits are billed at different rates depending on your insurance coverage.  Please reach out to your insurance provider with any questions.    If during the course of the call the physician/provider feels a video visit is not appropriate, you will not be charged for this service.\"    Patient has given verbal consent for Video visit? Yes  How would you like to obtain your AVS? MyChart  If you are dropped from the video visit, the video invite should be resent to: Other e-mail: [email or web address]  Will anyone else be joining your video visit? No    Video-Visit Details    Type of service:  Video Visit    Video Start Time: 1.03  Video End Time: 1.52    Originating Location (pt. Location): Home    Distant Location (provider location):  [facility] MULTIPLE SCLEROSIS [facility]     Platform used for Video Visit: [employer]      [facility] MS CLINIC VIRTUAL VISIT     ** Due to the recommendations of public health authorities, previously scheduled office visit " was converted to a video visit visit to limit unnecessary exposure in light of the COVID-19 pandemic**    Jair is a 35 year old male who is being evaluated via a billable video visit on 12/10 for the diagnosis of Relapsing Remitting MS.      HISTORY OF PRESENT ILLNESS:   Please refer to previous notes for details. In brief, Jair was diagnosed with Multiple Sclerosis at the age of 18. At that time he had presented with significant dizziness, vertigo and other vestibular symptoms attributable to cerebellar pathology. He was started on Copaxone and continued that medication until 2009. Due to inadequate disease control, treatment was switched to Rebif which he continued till 2013. Due to mood issues, he was started on Tecfidera.  In March 2017 patient had a spinal relapse and treatment was switched to Tysabri.  He was found to be AURA virus positive recently and a treatment change to rituximab was recommended by Dr. Escobar.      INTERVAL HISTORY SINCE LAST VISIT: Most recent visit with Dr. Escobar on 09/01/2020.  Prior to that in May, patient tested positive for COVID-19 antibodies with a febrile illness in March.  Overall doing well since then. No recent hospitalization or illness. Patient denies any new changes in vision, balance, strength or sensation suggestive of new relapse of multiple sclerosis since he was last seen here.    He is currently on Tysabri every 6 weeks and his most recent infusion was on 11/19 and the next infusion is scheduled for 12/23.  He tolerates these infusions well without any side effects.      He reports occasional numbness in his left leg as a residual symptom.  Also has some bladder urgency and frequency.  For MS related fatigue he currently uses Adderall 5 mg twice a day.    Past medical/surgical history:  1. Relapsing remitting multiple sclerosis  No surgeries in the past    Family history:  Negative for multiple sclerosis.    Social history:  He is  with 2 children  ages 3 and 5.  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  He denies smoking and recreational drug use.  Reports alcohol use on the weekends.    Vit D: 10,000 international units daily during winter months.    5/2019 MRI Brain:  IMPRESSION: Little change in greater than 20 T2 hyperintense white  matter lesions compatible with multiple sclerosis.    PHYSICAL EXAM: N/A (video visit)     ASSESSMENT/ PLAN:   1.  Relapsing remitting multiple sclerosis, on Tysabri with positive AURA virus serology: Patient was diagnosed with MS at the age of 18. Previously treated with Copaxone (inadequate disease control), on Rebif from 2009 until 2013 (had mood issues), on Tecfidera from 2013 till March 2017 (had a spinal relapse).  On Tysabri since 2017 and patient had 2 AURA virus positive serology recently.     Treatment change to rituximab was recommended by Dr. Escobar and we discussed this medication in detail.  Patient to complete labs required for rituximab at his convenience.    Per staff message from Dr. Escobar, he called the patient after this virtual visit. Due to the imminent availability of Covid vaccines, they discussed timing of initial rituximab infusion.  2 options (starting rituximab now versus continuing Tysabri until he is vaccinated for COVID-19 and then switch to rituximab) were discussed with the patient.  Patient will discuss this with his family and will notify us.  Of note patient was tested positive for COVID-19 antibodies in May 2020.    Per Dr. Escobar patient to get 1250 mg prednisone po qday x 3 days)  9 weeks after stopping Tysabri to prevent rebound relapse.  No need for a baseline MRI at this time.    Patient to follow-up with Dr. Escobar as scheduled in September 2021.  He will also need a follow-up MRI prior.    2.  Vitamin D supplementation: He recently started using 10,000 international units daily during winter months.    3. Please contact us with questions or concerns.     Aimee Pierre CNP  [facility]  Neurology

## 2020-12-10 NOTE — PATIENT INSTRUCTIONS
1. Continue vit D.     2. Labs at your convenience for Rituximab.     3. We will start the process for Rituximab and will notify you once we get the approval from your insurance.     4. F/u with Dr. Escobar as scheduled.

## 2020-12-10 NOTE — TELEPHONE ENCOUNTER
Patient had office visit with Aimee Pierre today and would like to change over to Rituximab per Dr. Escobar's recommendation; Patient's last Tysabri infusion was on 11/19/20; I am waiting to hear back from the patient as to where he would like to infuse; Baseline lab work has yet to be drawn; New Rituximab therapy plan orders placed and routed to Dr. Escobar for review and signature; I will send a PA request once the orders have been signed and an infusion center has been established.    Maci Veloz, MS RN Care Coordinator

## 2020-12-14 RX ORDER — HEPARIN SODIUM (PORCINE) LOCK FLUSH IV SOLN 100 UNIT/ML 100 UNIT/ML
5 SOLUTION INTRAVENOUS
Status: CANCELLED | OUTPATIENT
Start: 2020-12-14

## 2020-12-14 RX ORDER — HEPARIN SODIUM,PORCINE 10 UNIT/ML
5 VIAL (ML) INTRAVENOUS
Status: CANCELLED | OUTPATIENT
Start: 2020-12-14

## 2020-12-14 RX ORDER — DIPHENHYDRAMINE HCL 25 MG
50 CAPSULE ORAL ONCE
Status: CANCELLED
Start: 2020-12-14

## 2020-12-14 RX ORDER — METHYLPREDNISOLONE SODIUM SUCCINATE 125 MG/2ML
125 INJECTION, POWDER, LYOPHILIZED, FOR SOLUTION INTRAMUSCULAR; INTRAVENOUS ONCE
Status: CANCELLED
Start: 2020-12-14

## 2020-12-14 RX ORDER — ACETAMINOPHEN 325 MG/1
650 TABLET ORAL ONCE
Status: CANCELLED
Start: 2020-12-14

## 2020-12-15 ENCOUNTER — TELEPHONE (OUTPATIENT)
Dept: NEUROLOGY | Facility: CLINIC | Age: 35
End: 2020-12-15

## 2020-12-15 DIAGNOSIS — G35 MULTIPLE SCLEROSIS (H): ICD-10-CM

## 2020-12-15 LAB
BASOPHILS # BLD AUTO: 0 10E9/L (ref 0–0.2)
BASOPHILS NFR BLD AUTO: 0.4 %
DIFFERENTIAL METHOD BLD: NORMAL
EOSINOPHIL # BLD AUTO: 0.2 10E9/L (ref 0–0.7)
EOSINOPHIL NFR BLD AUTO: 2.6 %
ERYTHROCYTE [DISTWIDTH] IN BLOOD BY AUTOMATED COUNT: 14 % (ref 10–15)
HCT VFR BLD AUTO: 42.1 % (ref 40–53)
HGB BLD-MCNC: 14.5 G/DL (ref 13.3–17.7)
LYMPHOCYTES # BLD AUTO: 2.8 10E9/L (ref 0.8–5.3)
LYMPHOCYTES NFR BLD AUTO: 34.2 %
MCH RBC QN AUTO: 28.8 PG (ref 26.5–33)
MCHC RBC AUTO-ENTMCNC: 34.4 G/DL (ref 31.5–36.5)
MCV RBC AUTO: 84 FL (ref 78–100)
MONOCYTES # BLD AUTO: 0.6 10E9/L (ref 0–1.3)
MONOCYTES NFR BLD AUTO: 7.6 %
NEUTROPHILS # BLD AUTO: 4.6 10E9/L (ref 1.6–8.3)
NEUTROPHILS NFR BLD AUTO: 55.2 %
PLATELET # BLD AUTO: 172 10E9/L (ref 150–450)
RBC # BLD AUTO: 5.04 10E12/L (ref 4.4–5.9)
WBC # BLD AUTO: 8.3 10E9/L (ref 4–11)

## 2020-12-15 PROCEDURE — 86704 HEP B CORE ANTIBODY TOTAL: CPT | Performed by: NURSE PRACTITIONER

## 2020-12-15 PROCEDURE — 86706 HEP B SURFACE ANTIBODY: CPT | Performed by: NURSE PRACTITIONER

## 2020-12-15 PROCEDURE — 85025 COMPLETE CBC W/AUTO DIFF WBC: CPT | Performed by: NURSE PRACTITIONER

## 2020-12-15 PROCEDURE — 87340 HEPATITIS B SURFACE AG IA: CPT | Performed by: NURSE PRACTITIONER

## 2020-12-15 PROCEDURE — 87389 HIV-1 AG W/HIV-1&-2 AB AG IA: CPT | Performed by: NURSE PRACTITIONER

## 2020-12-15 PROCEDURE — 82784 ASSAY IGA/IGD/IGG/IGM EACH: CPT | Performed by: NURSE PRACTITIONER

## 2020-12-15 PROCEDURE — 36415 COLL VENOUS BLD VENIPUNCTURE: CPT | Performed by: NURSE PRACTITIONER

## 2020-12-15 PROCEDURE — 80076 HEPATIC FUNCTION PANEL: CPT | Performed by: NURSE PRACTITIONER

## 2020-12-15 NOTE — TELEPHONE ENCOUNTER
Prior Authorization Infusion/Clinic Administered Request    Location: [facility]  Diagnosis and ICD: Relapsing Remitting Multiple Sclerosis, G35  Drug/Therapy: Rituximab 500mg IV x1 dose    Previously Tried and Failed Therapies: Copaxone/glatiramer, Rebif, Tecfidera and Tysabri     Date of provider note with supporting information: 9/1/20 and 12/10/20    Urgency (When is the patient scheduled?): ASAP please    Would you like to include any research articles? n/a        If yes please call [phone] for further instructions about sending that information

## 2020-12-15 NOTE — TELEPHONE ENCOUNTER
Dr. Escobar signed the Rituximab infusion orders; I will send a PA request over to our infusion finance team; Dr. Escobar also advised the following:    I like to give people 3 days of steroids (1250 mg prednisone po qday x 3 days) 9 weeks after stopping Tysabri.    Jair's last Tysabri infusion was on 11/19/20, so the goal date for the steroids would be the week of 1/18/21; I will set a reminder for the week prior to get a prescription sent to the patient's pharmacy; Patient is scheduled to get his lab work done this afternoon.    Maci Veloz, MS RN Care Coordinator

## 2020-12-16 ENCOUNTER — MYC REFILL (OUTPATIENT)
Dept: NEUROLOGY | Facility: CLINIC | Age: 35
End: 2020-12-16

## 2020-12-16 DIAGNOSIS — G35 MULTIPLE SCLEROSIS (H): ICD-10-CM

## 2020-12-16 NOTE — TELEPHONE ENCOUNTER
Patient requesting refill of their Adderall; Patient was last seen in December and has follow up appointment in September 2021 with Dr. Escobar. Pended rx to Dr. Escobar for signature and will send electronically to the pharmacy once signed.    Marysol Cortez RN

## 2020-12-17 ENCOUNTER — MYC REFILL (OUTPATIENT)
Dept: NEUROLOGY | Facility: CLINIC | Age: 35
End: 2020-12-17

## 2020-12-17 DIAGNOSIS — G35 MULTIPLE SCLEROSIS (H): ICD-10-CM

## 2020-12-17 LAB
ALBUMIN SERPL-MCNC: 4.4 G/DL (ref 3.4–5)
ALP SERPL-CCNC: 82 U/L (ref 40–150)
ALT SERPL W P-5'-P-CCNC: 50 U/L (ref 0–70)
AST SERPL W P-5'-P-CCNC: 32 U/L (ref 0–45)
BILIRUB DIRECT SERPL-MCNC: 0.3 MG/DL (ref 0–0.2)
BILIRUB SERPL-MCNC: 1.8 MG/DL (ref 0.2–1.3)
HBV CORE AB SERPL QL IA: NONREACTIVE
HBV SURFACE AB SERPL IA-ACNC: 19.63 M[IU]/ML
HBV SURFACE AG SERPL QL IA: NONREACTIVE
HIV 1+2 AB+HIV1 P24 AG SERPL QL IA: NONREACTIVE
IGA SERPL-MCNC: 151 MG/DL (ref 84–499)
IGG SERPL-MCNC: 1013 MG/DL (ref 610–1616)
IGM SERPL-MCNC: 429 MG/DL (ref 35–242)
PROT SERPL-MCNC: 7.5 G/DL (ref 6.8–8.8)

## 2020-12-17 RX ORDER — DEXTROAMPHETAMINE SACCHARATE, AMPHETAMINE ASPARTATE, DEXTROAMPHETAMINE SULFATE AND AMPHETAMINE SULFATE 2.5; 2.5; 2.5; 2.5 MG/1; MG/1; MG/1; MG/1
10 TABLET ORAL 2 TIMES DAILY
Qty: 60 TABLET | Refills: 0 | Status: SHIPPED | OUTPATIENT
Start: 2020-12-17 | End: 2021-01-18

## 2020-12-17 RX ORDER — DEXTROAMPHETAMINE SACCHARATE, AMPHETAMINE ASPARTATE, DEXTROAMPHETAMINE SULFATE AND AMPHETAMINE SULFATE 2.5; 2.5; 2.5; 2.5 MG/1; MG/1; MG/1; MG/1
10 TABLET ORAL 2 TIMES DAILY
Qty: 60 TABLET | Refills: 0 | Status: SHIPPED | OUTPATIENT
Start: 2020-12-17 | End: 2021-08-16

## 2020-12-17 NOTE — TELEPHONE ENCOUNTER
Patient sent [employer] message requesting refill of their Adderall; Patient was last seen in December and has follow up appointment in September with Dr. Escobar; Pended rx to Dr. Escobar for signature and will send electronically to the pharmacy once signed.    Maci Veloz, MS RN Care Coordinator

## 2020-12-28 NOTE — TELEPHONE ENCOUNTER
Patient sent [employer] message asking if he should get the COVID vaccine prior to starting Rituximab; Will route to Dr. Escobar for input.    Maci Veloz, MS RN Care Coordinator

## 2020-12-30 NOTE — TELEPHONE ENCOUNTER
Ideally, yes he should get the vaccine before starting on rituximab.  That is what we discussed on the phone - could continue the Tysabri until he has been vaccinated, then switch to rituximab (2-3 weeks after the second vaccine shot).  But if we stop the Tysabri, can't wait around to start rituximab, it needs to be done 1 month after last Tysabri dose.

## 2021-01-05 NOTE — TELEPHONE ENCOUNTER
[employer] message sent to patient last week with Dr. Escobar's input; Jair responded over that weekend and has opted to just get his Rituximab infusion scheduled; I provided him the phone number to get that scheduled; He will also need a burst of steroids the week of 1/18/21; Patient will also need a COVID test prior to his infusion, which will be ordered by Dr. Escobar once the infusion is scheduled; [employer] message sent to patient with what to expect at his infusion as well and the plan going forward.    Maci Veloz, MS RN Care Coordinator

## 2021-01-06 DIAGNOSIS — Z11.59 ENCOUNTER FOR SCREENING FOR OTHER VIRAL DISEASES: Primary | ICD-10-CM

## 2021-01-07 NOTE — TELEPHONE ENCOUNTER
Patient is scheduled for his Rituximab infusion on 1/21/21, which is 9 weeks out from his last Tysabri infusion; We have a reminder for the patient to receive a burst of steroids that week as well; Patient also needs a COVID test prior to infusion; Dr. Escobar, please advise on whether I should try to get the patient's infusion moved up, as well as place a COVID order; Thank you.    Maci Veloz, MS RN Care Coordinator

## 2021-01-10 ENCOUNTER — HEALTH MAINTENANCE LETTER (OUTPATIENT)
Age: 36
End: 2021-01-10

## 2021-01-10 ENCOUNTER — AMBULATORY - HEALTHEAST (OUTPATIENT)
Dept: FAMILY MEDICINE | Facility: CLINIC | Age: 36
End: 2021-01-10

## 2021-01-10 DIAGNOSIS — Z11.59 ENCOUNTER FOR SCREENING FOR OTHER VIRAL DISEASES: ICD-10-CM

## 2021-01-11 RX ORDER — PREDNISONE 50 MG/1
1250 TABLET ORAL DAILY
Qty: 75 TABLET | Refills: 0 | Status: SHIPPED | OUTPATIENT
Start: 2021-01-11 | End: 2021-01-14

## 2021-01-14 NOTE — TELEPHONE ENCOUNTER
[employer] message reminder sent to patient to start his steroids today.    Maci Veloz, MS RN Care Coordinator

## 2021-01-17 ENCOUNTER — AMBULATORY - HEALTHEAST (OUTPATIENT)
Dept: FAMILY MEDICINE | Facility: CLINIC | Age: 36
End: 2021-01-17

## 2021-01-17 DIAGNOSIS — Z11.59 ENCOUNTER FOR SCREENING FOR OTHER VIRAL DISEASES: ICD-10-CM

## 2021-01-18 ENCOUNTER — MYC REFILL (OUTPATIENT)
Dept: NEUROLOGY | Facility: CLINIC | Age: 36
End: 2021-01-18

## 2021-01-18 ENCOUNTER — COMMUNICATION - HEALTHEAST (OUTPATIENT)
Dept: SCHEDULING | Facility: CLINIC | Age: 36
End: 2021-01-18

## 2021-01-18 DIAGNOSIS — G35 MULTIPLE SCLEROSIS (H): ICD-10-CM

## 2021-01-18 LAB
SARS-COV-2 PCR COMMENT: NORMAL
SARS-COV-2 RNA SPEC QL NAA+PROBE: NEGATIVE
SARS-COV-2 VIRUS SPECIMEN SOURCE: NORMAL

## 2021-01-18 NOTE — TELEPHONE ENCOUNTER
Patient sent [email or web address] message requesting refill of their Adderall; Patient was last seen in December and has follow up appointment in September with Dr. Escobar; Pended rx to Dr. Escobar for signature and will send electronically to the pharmacy once signed.    Maci Veloz, MS RN Care Coordinator

## 2021-01-21 ENCOUNTER — INFUSION THERAPY VISIT (OUTPATIENT)
Dept: INFUSION THERAPY | Facility: CLINIC | Age: 36
End: 2021-01-21
Attending: PSYCHIATRY & NEUROLOGY
Payer: COMMERCIAL

## 2021-01-21 VITALS
DIASTOLIC BLOOD PRESSURE: 67 MMHG | BODY MASS INDEX: 23.98 KG/M2 | OXYGEN SATURATION: 97 % | HEART RATE: 91 BPM | TEMPERATURE: 98.7 F | RESPIRATION RATE: 101 BRPM | SYSTOLIC BLOOD PRESSURE: 138 MMHG | HEIGHT: 71 IN | WEIGHT: 171.3 LBS

## 2021-01-21 DIAGNOSIS — G35 MULTIPLE SCLEROSIS (H): Primary | ICD-10-CM

## 2021-01-21 PROCEDURE — 258N000003 HC RX IP 258 OP 636: Performed by: PSYCHIATRY & NEUROLOGY

## 2021-01-21 PROCEDURE — 96365 THER/PROPH/DIAG IV INF INIT: CPT

## 2021-01-21 PROCEDURE — 96375 TX/PRO/DX INJ NEW DRUG ADDON: CPT

## 2021-01-21 PROCEDURE — 250N000011 HC RX IP 250 OP 636: Performed by: PSYCHIATRY & NEUROLOGY

## 2021-01-21 PROCEDURE — 250N000013 HC RX MED GY IP 250 OP 250 PS 637: Performed by: PSYCHIATRY & NEUROLOGY

## 2021-01-21 PROCEDURE — 96366 THER/PROPH/DIAG IV INF ADDON: CPT

## 2021-01-21 RX ORDER — DIPHENHYDRAMINE HCL 25 MG
50 CAPSULE ORAL ONCE
Status: CANCELLED
Start: 2021-01-21

## 2021-01-21 RX ORDER — HEPARIN SODIUM (PORCINE) LOCK FLUSH IV SOLN 100 UNIT/ML 100 UNIT/ML
5 SOLUTION INTRAVENOUS
Status: CANCELLED | OUTPATIENT
Start: 2021-01-21

## 2021-01-21 RX ORDER — METHYLPREDNISOLONE SODIUM SUCCINATE 125 MG/2ML
125 INJECTION, POWDER, LYOPHILIZED, FOR SOLUTION INTRAMUSCULAR; INTRAVENOUS ONCE
Status: CANCELLED
Start: 2021-01-21

## 2021-01-21 RX ORDER — ACETAMINOPHEN 325 MG/1
650 TABLET ORAL ONCE
Status: CANCELLED
Start: 2021-01-21

## 2021-01-21 RX ORDER — METHYLPREDNISOLONE SODIUM SUCCINATE 125 MG/2ML
125 INJECTION, POWDER, LYOPHILIZED, FOR SOLUTION INTRAMUSCULAR; INTRAVENOUS ONCE
Status: COMPLETED | OUTPATIENT
Start: 2021-01-21 | End: 2021-01-21

## 2021-01-21 RX ORDER — HEPARIN SODIUM,PORCINE 10 UNIT/ML
5 VIAL (ML) INTRAVENOUS
Status: CANCELLED | OUTPATIENT
Start: 2021-01-21

## 2021-01-21 RX ORDER — DEXTROAMPHETAMINE SACCHARATE, AMPHETAMINE ASPARTATE, DEXTROAMPHETAMINE SULFATE AND AMPHETAMINE SULFATE 2.5; 2.5; 2.5; 2.5 MG/1; MG/1; MG/1; MG/1
10 TABLET ORAL 2 TIMES DAILY
Qty: 60 TABLET | Refills: 0 | Status: SHIPPED | OUTPATIENT
Start: 2021-01-21 | End: 2021-02-18

## 2021-01-21 RX ORDER — DIPHENHYDRAMINE HCL 25 MG
50 CAPSULE ORAL ONCE
Status: COMPLETED | OUTPATIENT
Start: 2021-01-21 | End: 2021-01-21

## 2021-01-21 RX ORDER — ACETAMINOPHEN 325 MG/1
650 TABLET ORAL ONCE
Status: COMPLETED | OUTPATIENT
Start: 2021-01-21 | End: 2021-01-21

## 2021-01-21 RX ADMIN — SODIUM CHLORIDE 50 ML: 9 INJECTION, SOLUTION INTRAVENOUS at 13:22

## 2021-01-21 RX ADMIN — DIPHENHYDRAMINE HYDROCHLORIDE 50 MG: 25 CAPSULE ORAL at 09:56

## 2021-01-21 RX ADMIN — RITUXIMAB-ABBS 500 MG: 10 INJECTION, SOLUTION INTRAVENOUS at 10:22

## 2021-01-21 RX ADMIN — METHYLPREDNISOLONE SODIUM SUCCINATE 125 MG: 125 INJECTION, POWDER, FOR SOLUTION INTRAMUSCULAR; INTRAVENOUS at 09:57

## 2021-01-21 RX ADMIN — ACETAMINOPHEN 650 MG: 325 TABLET ORAL at 09:56

## 2021-01-21 ASSESSMENT — PAIN SCALES - GENERAL: PAINLEVEL: NO PAIN (0)

## 2021-01-21 ASSESSMENT — MIFFLIN-ST. JEOR: SCORE: 1736.19

## 2021-01-21 NOTE — LETTER
1/21/2021         RE: Real Sellers  [address]  [place] [address]        Dear Colleague,    Thank you for referring your patient, Real Sellers, to the [facility] TREATMENT [facility]. Please see a copy of my visit note below.    Nursing Note  Real Sellers [facility] for:   Chief Complaint   Patient presents with     Infusion     rituxan     During today's [facility] appointment, orders from Dr. Escobar were completed.  Frequency: once    Progress note:  Patient identification verified by name and date of birth.  Assessment completed.  Vitals recorded in Doc Flowsheets.  Patient was provided with education regarding medication/procedure and possible side effects.  Patient verbalized understanding.     present during visit today: Not Applicable.    Treatment Conditions: ~~~ NOTE: If the patient answers yes to any of the questions below, hold the infusion and contact ordering provider or on-call provider.    1. Have you recently had an elevated temperature, fever, chills, productive cough, coughing for 3 weeks or longer or hemoptysis, abnormal vital signs, night sweats,  chest pain or have you noticed a decrease in your appetite, unexplained weight loss or fatigue? No  2. Do you have any open wounds or new incisions? No  3. Do you have any recent or upcoming hospitalizations, surgeries or dental procedures? No  4. Do you currently have or recently have had any signs of illness or infection or are you on any antibiotics? No  5. Have you had any new, sudden or worsening abdominal pain? No  6. Have you or anyone in your household received a live vaccination in the past 4 weeks? Please note:  No live vaccines while on biologic/chemotherapy until 6 months after the last treatment.  Patient can receive the flu vaccine (shot only) and the pneumovax.  It is optimal  for the patient to get these vaccines mid cycle, but they can be given at any time as long as it is not on the day of the infusion. No  7. Have you recently been diagnosed with any new nervous system diseases (ie. Multiple sclerosis, Guillain [place], seizures, neurological changes) or cancer diagnosis? No  8. Are you on any form of radiation or chemotherapy? No  9. Are you pregnant or breast feeding or do you have plans of pregnancy in the future? No  10. Have you been having any signs of worsening depression or suicidal ideations?  (benlysta only) No  11. Have there been any other new onset medical symptoms? No      Premedications: administered per order.    Drug Waste Record: No    Infusion length and rate:  Rituxan infusion starts at 50 ml/hr, then increased by 50 ml/hr every 30 minutes to final rate of 300 ml/hr.    Labs: were not ordered for this appointment.    Vascular access: peripheral IV placed today.    Is the next appt scheduled? NA  Asymptomatic COVID test completed? 1/17/20    Post Infusion Assessment:  Patient tolerated infusion without incident.  Patient observed for 30 minutes post rituxan infusion, per protocol.  Blood return noted pre and post infusion.  Site patent and intact, free from redness, edema or discomfort.  No evidence of extravasations.  Access discontinued per protocol.  Biologic Infusion Post Education: Call the triage nurse at your clinic or seek medical attention if you have chills and/or temperature greater than or equal to 100.5, uncontrolled nausea/vomiting, diarrhea, constipation, dizziness, shortness of breath, chest pain, heart palpitations, weakness or any other new or concerning symptoms, questions or concerns.  You cannot have any live virus vaccines prior to or during treatment or up to 6 months post infusion.  If you have an upcoming surgery, medical procedure or dental procedure during treatment, this should be discussed with your ordering physician and your  "surgeon/dentist.  If you are having any concerning symptom, if you are unsure if you should get your next infusion or wish to speak to a provider before your next infusion, please call your care coordinator or triage nurse at your clinic to notify them so we can adequately serve you.     Discharge Plan:   Follow up plan of care with: ongoing infusions at [facility]., ordering provider as scheduled., after visit summary given to patient and pt will check in with Dr. Escobar's co-ordinator by phone.  Discharge instructions were reviewed with patient.  Patient/representative verbalized understanding of discharge instructions and all questions answered.  Patient discharged from [facility] in stable condition.    Prudence Morgan        /59   Pulse 74   Temp 98  F (36.7  C)   Ht 1.791 m (5' 10.5\")   Wt 77.7 kg (171 lb 4.8 oz)   SpO2 97%   BMI 24.23 kg/m            Again, thank you for allowing me to participate in the care of your patient.        Sincerely,        [facility] [place]    "

## 2021-01-21 NOTE — PROGRESS NOTES
Nursing Note  Real Sellers presents today to [facility] for:   Chief Complaint   Patient presents with     Infusion     rituxan     During today's [facility] appointment, orders from Dr. Escobar were completed.  Frequency: once    Progress note:  Patient identification verified by name and date of birth.  Assessment completed.  Vitals recorded in Doc Flowsheets.  Patient was provided with education regarding medication/procedure and possible side effects.  Patient verbalized understanding.     present during visit today: Not Applicable.    Treatment Conditions: ~~~ NOTE: If the patient answers yes to any of the questions below, hold the infusion and contact ordering provider or on-call provider.    1. Have you recently had an elevated temperature, fever, chills, productive cough, coughing for 3 weeks or longer or hemoptysis, abnormal vital signs, night sweats,  chest pain or have you noticed a decrease in your appetite, unexplained weight loss or fatigue? No  2. Do you have any open wounds or new incisions? No  3. Do you have any recent or upcoming hospitalizations, surgeries or dental procedures? No  4. Do you currently have or recently have had any signs of illness or infection or are you on any antibiotics? No  5. Have you had any new, sudden or worsening abdominal pain? No  6. Have you or anyone in your household received a live vaccination in the past 4 weeks? Please note:  No live vaccines while on biologic/chemotherapy until 6 months after the last treatment.  Patient can receive the flu vaccine (shot only) and the pneumovax.  It is optimal for the patient to get these vaccines mid cycle, but they can be given at any time as long as it is not on the day of the infusion. No  7. Have you recently been diagnosed with any new nervous system diseases (ie. Multiple sclerosis, Guillain [place], seizures, neurological changes) or cancer  diagnosis? No  8. Are you on any form of radiation or chemotherapy? No  9. Are you pregnant or breast feeding or do you have plans of pregnancy in the future? No  10. Have you been having any signs of worsening depression or suicidal ideations?  (benlysta only) No  11. Have there been any other new onset medical symptoms? No      Premedications: administered per order.    Drug Waste Record: No    Infusion length and rate:  Rituxan infusion starts at 50 ml/hr, then increased by 50 ml/hr every 30 minutes to final rate of 300 ml/hr.    Labs: were not ordered for this appointment.    Vascular access: peripheral IV placed today.    Is the next appt scheduled? NA  Asymptomatic COVID test completed? 1/17/20    Post Infusion Assessment:  Patient tolerated infusion without incident.  Patient observed for 30 minutes post rituxan infusion, per protocol.  Blood return noted pre and post infusion.  Site patent and intact, free from redness, edema or discomfort.  No evidence of extravasations.  Access discontinued per protocol.  Biologic Infusion Post Education: Call the triage nurse at your clinic or seek medical attention if you have chills and/or temperature greater than or equal to 100.5, uncontrolled nausea/vomiting, diarrhea, constipation, dizziness, shortness of breath, chest pain, heart palpitations, weakness or any other new or concerning symptoms, questions or concerns.  You cannot have any live virus vaccines prior to or during treatment or up to 6 months post infusion.  If you have an upcoming surgery, medical procedure or dental procedure during treatment, this should be discussed with your ordering physician and your surgeon/dentist.  If you are having any concerning symptom, if you are unsure if you should get your next infusion or wish to speak to a provider before your next infusion, please call your care coordinator or triage nurse at your clinic to notify them so we can adequately serve you.     Discharge Plan:  "  Follow up plan of care with: ongoing infusions at [facility]., ordering provider as scheduled., after visit summary given to patient and pt will check in with Dr. Escobar's co-ordinator by phone.  Discharge instructions were reviewed with patient.  Patient/representative verbalized understanding of discharge instructions and all questions answered.  Patient discharged from [facility] in stable condition.    Prudence Pennyber        /59   Pulse 74   Temp 98  F (36.7  C)   Ht 1.791 m (5' 10.5\")   Wt 77.7 kg (171 lb 4.8 oz)   SpO2 97%   BMI 24.23 kg/m        "

## 2021-02-18 ENCOUNTER — MYC REFILL (OUTPATIENT)
Dept: NEUROLOGY | Facility: CLINIC | Age: 36
End: 2021-02-18

## 2021-02-18 ENCOUNTER — MYC MEDICAL ADVICE (OUTPATIENT)
Dept: NEUROLOGY | Facility: CLINIC | Age: 36
End: 2021-02-18

## 2021-02-18 DIAGNOSIS — G35 MULTIPLE SCLEROSIS (H): ICD-10-CM

## 2021-02-18 RX ORDER — DEXTROAMPHETAMINE SACCHARATE, AMPHETAMINE ASPARTATE, DEXTROAMPHETAMINE SULFATE AND AMPHETAMINE SULFATE 2.5; 2.5; 2.5; 2.5 MG/1; MG/1; MG/1; MG/1
10 TABLET ORAL 2 TIMES DAILY
Qty: 60 TABLET | Refills: 0 | Status: SHIPPED | OUTPATIENT
Start: 2021-02-18 | End: 2021-03-23

## 2021-02-18 NOTE — TELEPHONE ENCOUNTER
Patient sent [facility] message requesting refill of their Adderall; Patient was last seen in December and has follow up appointment in September with Dr. Escobar; Pended rx to Dr. Escobar for signature and will send electronically to the pharmacy once signed.    Maci Veloz, MS RN Care Coordinator

## 2021-02-18 NOTE — TELEPHONE ENCOUNTER
Patient sent [employer] message stating that he received a large bill for his Rituximab infusion; I will ask our infusion finance team to call the patient regarding this.    Maci Veloz, MS RN Care Coordinator

## 2021-03-04 ENCOUNTER — TELEPHONE (OUTPATIENT)
Dept: NEUROLOGY | Facility: CLINIC | Age: 36
End: 2021-03-04

## 2021-03-04 NOTE — TELEPHONE ENCOUNTER
Patient is no longer on Tysabri; Discontinuation questionnaire filled out online.    Maci Veloz, MS RN Care Coordinator

## 2021-03-04 NOTE — TELEPHONE ENCOUNTER
[facility] Call Center    Phone Message    May a detailed message be left on voicemail: yes     Reason for Call: Other: Kathy calling from [employer] support services - wondering if patient is still on Tysabri, because patient has not had an infusion since 11/19/20.    Writer was disconnected from Kathy before able to get a call back number.     Action Taken: Other:  MS    Travel Screening: Not Applicable

## 2021-03-23 ENCOUNTER — MYC REFILL (OUTPATIENT)
Dept: NEUROLOGY | Facility: CLINIC | Age: 36
End: 2021-03-23

## 2021-03-23 DIAGNOSIS — G35 MULTIPLE SCLEROSIS (H): ICD-10-CM

## 2021-03-23 NOTE — TELEPHONE ENCOUNTER
Patient sent [employer] message requesting refill of their Adderall; Patient was last seen in December and has follow up appointment in September with Dr. Escobar; Pended rx to Dr. Escobar for signature and will send electronically to the pharmacy once signed.    Maci Veloz, MS RN Care Coordinator

## 2021-03-24 RX ORDER — DEXTROAMPHETAMINE SACCHARATE, AMPHETAMINE ASPARTATE, DEXTROAMPHETAMINE SULFATE AND AMPHETAMINE SULFATE 2.5; 2.5; 2.5; 2.5 MG/1; MG/1; MG/1; MG/1
10 TABLET ORAL 2 TIMES DAILY
Qty: 60 TABLET | Refills: 0 | Status: SHIPPED | OUTPATIENT
Start: 2021-03-24 | End: 2021-04-19

## 2021-04-19 ENCOUNTER — MYC REFILL (OUTPATIENT)
Dept: NEUROLOGY | Facility: CLINIC | Age: 36
End: 2021-04-19

## 2021-04-19 DIAGNOSIS — G35 MULTIPLE SCLEROSIS (H): ICD-10-CM

## 2021-04-19 NOTE — TELEPHONE ENCOUNTER
Patient sent [employer] message requesting refill of their Adderall; Patient was last seen in December and has follow up appointment in September with Dr. Escobar; Pended rx to Dr. Escobar for signature and will send electronically to the pharmacy once signed.    Maci Veloz, MS RN Care Coordinator

## 2021-04-20 RX ORDER — DEXTROAMPHETAMINE SACCHARATE, AMPHETAMINE ASPARTATE, DEXTROAMPHETAMINE SULFATE AND AMPHETAMINE SULFATE 2.5; 2.5; 2.5; 2.5 MG/1; MG/1; MG/1; MG/1
10 TABLET ORAL 2 TIMES DAILY
Qty: 60 TABLET | Refills: 0 | Status: SHIPPED | OUTPATIENT
Start: 2021-04-20 | End: 2021-05-17

## 2021-05-17 ENCOUNTER — MYC REFILL (OUTPATIENT)
Dept: NEUROLOGY | Facility: CLINIC | Age: 36
End: 2021-05-17

## 2021-05-17 DIAGNOSIS — G35 MULTIPLE SCLEROSIS (H): ICD-10-CM

## 2021-05-17 RX ORDER — DEXTROAMPHETAMINE SACCHARATE, AMPHETAMINE ASPARTATE, DEXTROAMPHETAMINE SULFATE AND AMPHETAMINE SULFATE 2.5; 2.5; 2.5; 2.5 MG/1; MG/1; MG/1; MG/1
10 TABLET ORAL 2 TIMES DAILY
Qty: 60 TABLET | Refills: 0 | Status: SHIPPED | OUTPATIENT
Start: 2021-05-17 | End: 2021-06-17

## 2021-05-17 NOTE — TELEPHONE ENCOUNTER
Patient sent [employer] message requesting refill of their Adderall; Patient was last seen in December and has follow up appointment in September with Dr. Escobar; Pended rx to Dr. Escobar for signature and will send electronically to the pharmacy once signed.    Maci Veloz, MS RN Care Coordinator      
EMT/paramedic

## 2021-06-11 ENCOUNTER — APPOINTMENT (OUTPATIENT)
Dept: URBAN - METROPOLITAN AREA CLINIC 252 | Age: 36
Setting detail: DERMATOLOGY
End: 2021-06-11

## 2021-06-11 VITALS — WEIGHT: 165 LBS | HEIGHT: 71 IN | RESPIRATION RATE: 14 BRPM

## 2021-06-11 DIAGNOSIS — L64.8 OTHER ANDROGENIC ALOPECIA: ICD-10-CM

## 2021-06-11 DIAGNOSIS — L63.8 OTHER ALOPECIA AREATA: ICD-10-CM

## 2021-06-11 PROCEDURE — 99213 OFFICE O/P EST LOW 20 MIN: CPT | Mod: 25

## 2021-06-11 PROCEDURE — OTHER COUNSELING: OTHER

## 2021-06-11 PROCEDURE — OTHER INTRALESIONAL KENALOG: OTHER

## 2021-06-11 PROCEDURE — 11900 INJECT SKIN LESIONS </W 7: CPT

## 2021-06-11 ASSESSMENT — LOCATION DETAILED DESCRIPTION DERM
LOCATION DETAILED: HAIR
LOCATION DETAILED: RIGHT SUPERIOR POSTERIOR NECK
LOCATION DETAILED: LEFT MEDIAL FRONTAL SCALP
LOCATION DETAILED: LEFT SUPERIOR POSTERIOR NECK

## 2021-06-11 ASSESSMENT — LOCATION SIMPLE DESCRIPTION DERM
LOCATION SIMPLE: POSTERIOR NECK
LOCATION SIMPLE: HAIR
LOCATION SIMPLE: LEFT SCALP

## 2021-06-11 ASSESSMENT — LOCATION ZONE DERM
LOCATION ZONE: NECK
LOCATION ZONE: SCALP

## 2021-06-11 NOTE — PROCEDURE: COUNSELING
Patient Specific Counseling (Will Not Stick From Patient To Patient): Discussed finasteride but patient declined today and wishes to start with minoxidil
Detail Level: Zone
Detail Level: Detailed
Patient Specific Counseling (Will Not Stick From Patient To Patient): - Discussed and recommended intralesional Kenalog injection.\\n- Advised that a series of injections may be necessary and are generally spaced out monthly until hair regrowth is achieved.

## 2021-06-11 NOTE — HPI: HAIR LOSS (ALOPECIA AREATA)
Is This A New Presentation, Or A Follow-Up?: Hair Loss
Additional History: Patch showed up 2 weeks ago.  He has Multiple Sclerosis and gets alopecia after Multiple Sclerosis flares. Also has crown thinning.

## 2021-06-14 DIAGNOSIS — G35 MULTIPLE SCLEROSIS (H): ICD-10-CM

## 2021-06-14 NOTE — TELEPHONE ENCOUNTER
Received refill request for SERTRALINE from [facility]; Patient was last seen on 12/10/2020 and has follow up appointment on 09/07/2021 with Dr Escobar. Pended to [facility] pool for review/approval    Janet Eric MA

## 2021-06-17 ENCOUNTER — MYC REFILL (OUTPATIENT)
Dept: NEUROLOGY | Facility: CLINIC | Age: 36
End: 2021-06-17

## 2021-06-17 DIAGNOSIS — G35 MULTIPLE SCLEROSIS (H): ICD-10-CM

## 2021-06-18 NOTE — TELEPHONE ENCOUNTER
Patient requesting refill of their Adderall; Patient was last seen in December and has follow up appointment in September with Dr. Escobar. Pended rx to Dr. Escobar for signature and will send electronically to the pharmacy once signed.    Marysol Cortez RN

## 2021-06-21 RX ORDER — DEXTROAMPHETAMINE SACCHARATE, AMPHETAMINE ASPARTATE, DEXTROAMPHETAMINE SULFATE AND AMPHETAMINE SULFATE 2.5; 2.5; 2.5; 2.5 MG/1; MG/1; MG/1; MG/1
10 TABLET ORAL 2 TIMES DAILY
Qty: 60 TABLET | Refills: 0 | Status: SHIPPED | OUTPATIENT
Start: 2021-06-21 | End: 2021-07-19

## 2021-07-05 ENCOUNTER — TELEPHONE (OUTPATIENT)
Dept: NEUROLOGY | Facility: CLINIC | Age: 36
End: 2021-07-05

## 2021-07-05 NOTE — TELEPHONE ENCOUNTER
Patient is due to have his CD19 checked in a couple weeks, mid-July; [employer] message sent to Jair with a reminder and advising him that he can go to any [facility] location to have this done, or he can have the lab orders faxed elsewhere, if desired.    Maci Veloz, MS RN Care Coordinator

## 2021-07-22 ENCOUNTER — LAB (OUTPATIENT)
Dept: LAB | Facility: CLINIC | Age: 36
End: 2021-07-22
Payer: COMMERCIAL

## 2021-07-22 DIAGNOSIS — G35 MULTIPLE SCLEROSIS (H): ICD-10-CM

## 2021-07-22 PROCEDURE — 86355 B CELLS TOTAL COUNT: CPT

## 2021-07-22 PROCEDURE — 36415 COLL VENOUS BLD VENIPUNCTURE: CPT

## 2021-07-23 LAB
CD19 CELLS # BLD: 48 CELLS/UL (ref 107–698)
CD19 CELLS NFR BLD: 3 % (ref 6–27)

## 2021-07-26 RX ORDER — HEPARIN SODIUM,PORCINE 10 UNIT/ML
5 VIAL (ML) INTRAVENOUS
Status: CANCELLED | OUTPATIENT
Start: 2021-07-26

## 2021-07-26 RX ORDER — EPINEPHRINE 1 MG/ML
0.3 INJECTION, SOLUTION, CONCENTRATE INTRAVENOUS EVERY 5 MIN PRN
Status: CANCELLED | OUTPATIENT
Start: 2021-07-26

## 2021-07-26 RX ORDER — HEPARIN SODIUM (PORCINE) LOCK FLUSH IV SOLN 100 UNIT/ML 100 UNIT/ML
5 SOLUTION INTRAVENOUS
Status: CANCELLED | OUTPATIENT
Start: 2021-07-26

## 2021-07-26 RX ORDER — METHYLPREDNISOLONE SODIUM SUCCINATE 125 MG/2ML
125 INJECTION, POWDER, LYOPHILIZED, FOR SOLUTION INTRAMUSCULAR; INTRAVENOUS ONCE
Status: CANCELLED
Start: 2021-07-26

## 2021-07-26 RX ORDER — DIPHENHYDRAMINE HYDROCHLORIDE 50 MG/ML
50 INJECTION INTRAMUSCULAR; INTRAVENOUS
Status: CANCELLED
Start: 2021-07-26

## 2021-07-26 RX ORDER — METHYLPREDNISOLONE SODIUM SUCCINATE 125 MG/2ML
125 INJECTION, POWDER, LYOPHILIZED, FOR SOLUTION INTRAMUSCULAR; INTRAVENOUS
Status: CANCELLED
Start: 2021-07-26

## 2021-07-26 RX ORDER — ACETAMINOPHEN 325 MG/1
650 TABLET ORAL ONCE
Status: CANCELLED
Start: 2021-07-26

## 2021-07-26 RX ORDER — ALBUTEROL SULFATE 0.83 MG/ML
2.5 SOLUTION RESPIRATORY (INHALATION)
Status: CANCELLED | OUTPATIENT
Start: 2021-07-26

## 2021-07-26 RX ORDER — MEPERIDINE HYDROCHLORIDE 25 MG/ML
25 INJECTION INTRAMUSCULAR; INTRAVENOUS; SUBCUTANEOUS EVERY 30 MIN PRN
Status: CANCELLED | OUTPATIENT
Start: 2021-07-26

## 2021-07-26 RX ORDER — NALOXONE HYDROCHLORIDE 0.4 MG/ML
0.2 INJECTION, SOLUTION INTRAMUSCULAR; INTRAVENOUS; SUBCUTANEOUS
Status: CANCELLED | OUTPATIENT
Start: 2021-07-26

## 2021-07-26 RX ORDER — DIPHENHYDRAMINE HCL 25 MG
50 CAPSULE ORAL ONCE
Status: CANCELLED
Start: 2021-07-26

## 2021-07-26 RX ORDER — ALBUTEROL SULFATE 90 UG/1
1-2 AEROSOL, METERED RESPIRATORY (INHALATION)
Status: CANCELLED
Start: 2021-07-26

## 2021-07-26 NOTE — TELEPHONE ENCOUNTER
[facility] message sent to Jair, and he said he has not had his COVID vaccine, as he was waiting on this test result first; He will proceed with his vaccine series, then plan to get his next infusion 2-3 weeks after that; He also has new insurance, so he is going to check to see which infusion center is in-network for him, then let me know; New rituximab therapy plan orders placed and routed to Dr. Escobar for review and signature.    Maci Veloz, MS RN Care Coordinator

## 2021-07-26 NOTE — TELEPHONE ENCOUNTER
CD19 B cell count completed; Dr. Escobar, please advise on results and plan; Thank you.    Maci Veloz, MS RN Care Coordinator

## 2021-07-26 NOTE — TELEPHONE ENCOUNTER
His B cells are about back to the point where it's time for the next infusion.  Did he get his Covid vaccine?  If not, I suggest he get it now, and do the next rituximab infusion 2-3 weeks after the last dose.  If he already has, then can go ahead and arrange the next infusion.

## 2021-08-02 NOTE — TELEPHONE ENCOUNTER
I left [facility] for Jair asking if he has found out from his insurance which infusion center would be in-network for him so we can get the PA started; I asked that he let me know via [employer].    Maci Veloz, MS RN Care Coordinator

## 2021-08-03 ENCOUNTER — TELEPHONE (OUTPATIENT)
Dept: NEUROLOGY | Facility: CLINIC | Age: 36
End: 2021-08-03

## 2021-08-03 NOTE — TELEPHONE ENCOUNTER
Prior Authorization Infusion/Clinic Administered Request    Location: [facility]  Diagnosis and ICD: Relapsing Remitting Multiple Sclerosis, G35  Drug/Therapy: rituximab/Truxima 500mg IV x1 dose    Previously Tried and Failed Therapies: Copaxone/glatiramer, Rebif, Tecfidera/dimethyl fumarate and Tysabri     Date of provider note with supporting information: 12/10/20    Urgency (When is the patient scheduled?): ASAP please    Would you like to include any research articles? n/a        If yes please call [phone] for further instructions about sending that information

## 2021-08-03 NOTE — TELEPHONE ENCOUNTER
Patient's insurance says that our infusion center is in-network for him, so I will have our infusion finance team start working on authorization; I also advised Jair to update his insurance information in his chart through our call center; He received his first COVID vaccine on 8/1/21 and is scheduled for his second one on 8/22/21, thus giving a target infusion date of 9/6/21-9/13/21 based on Dr. Escobar's note; He also happens to be scheduled with Dr. Escobar for an office visit on 9/7/21.    Maci Veloz, MS RN Care Coordinator

## 2021-08-05 NOTE — TELEPHONE ENCOUNTER
PA approved per infusion finance and the copay assistance is still active; [employer] message sent to Jair advising this and the phone number to call the infusion center.    Maci Veloz MS RN Care Coordinator

## 2021-08-16 ENCOUNTER — MYC REFILL (OUTPATIENT)
Dept: NEUROLOGY | Facility: CLINIC | Age: 36
End: 2021-08-16

## 2021-08-16 DIAGNOSIS — G35 MULTIPLE SCLEROSIS (H): ICD-10-CM

## 2021-08-16 RX ORDER — DEXTROAMPHETAMINE SACCHARATE, AMPHETAMINE ASPARTATE, DEXTROAMPHETAMINE SULFATE AND AMPHETAMINE SULFATE 2.5; 2.5; 2.5; 2.5 MG/1; MG/1; MG/1; MG/1
10 TABLET ORAL 2 TIMES DAILY
Qty: 60 TABLET | Refills: 0 | Status: SHIPPED | OUTPATIENT
Start: 2021-08-16 | End: 2021-09-16

## 2021-08-16 RX ORDER — DEXTROAMPHETAMINE SACCHARATE, AMPHETAMINE ASPARTATE, DEXTROAMPHETAMINE SULFATE AND AMPHETAMINE SULFATE 2.5; 2.5; 2.5; 2.5 MG/1; MG/1; MG/1; MG/1
10 TABLET ORAL 2 TIMES DAILY
Qty: 60 TABLET | Refills: 0 | Status: CANCELLED | OUTPATIENT
Start: 2021-08-16

## 2021-08-16 NOTE — TELEPHONE ENCOUNTER
Patient sent [employer] message requesting refill of their Adderall; Patient was last seen in December and has follow up appointment in September with Dr. Escobar; Pended rx to Dr. Escobar for signature and will send electronically to the pharmacy once signed.    Maci Veloz, MS RN Care Coordinator

## 2021-09-07 ENCOUNTER — ANCILLARY PROCEDURE (OUTPATIENT)
Dept: MRI IMAGING | Facility: CLINIC | Age: 36
End: 2021-09-07
Attending: NURSE PRACTITIONER
Payer: COMMERCIAL

## 2021-09-07 ENCOUNTER — OFFICE VISIT (OUTPATIENT)
Dept: NEUROLOGY | Facility: CLINIC | Age: 36
End: 2021-09-07
Attending: PSYCHIATRY & NEUROLOGY
Payer: COMMERCIAL

## 2021-09-07 VITALS
HEIGHT: 71 IN | OXYGEN SATURATION: 97 % | HEART RATE: 77 BPM | SYSTOLIC BLOOD PRESSURE: 135 MMHG | DIASTOLIC BLOOD PRESSURE: 83 MMHG | BODY MASS INDEX: 24.61 KG/M2 | WEIGHT: 175.8 LBS

## 2021-09-07 DIAGNOSIS — G35 MS (MULTIPLE SCLEROSIS) (H): Primary | ICD-10-CM

## 2021-09-07 DIAGNOSIS — G35 MULTIPLE SCLEROSIS (H): ICD-10-CM

## 2021-09-07 DIAGNOSIS — R53.83 OTHER FATIGUE: ICD-10-CM

## 2021-09-07 PROCEDURE — 70553 MRI BRAIN STEM W/O & W/DYE: CPT | Performed by: RADIOLOGY

## 2021-09-07 PROCEDURE — 99214 OFFICE O/P EST MOD 30 MIN: CPT | Performed by: PSYCHIATRY & NEUROLOGY

## 2021-09-07 PROCEDURE — A9585 GADOBUTROL INJECTION: HCPCS | Performed by: RADIOLOGY

## 2021-09-07 PROCEDURE — G0463 HOSPITAL OUTPT CLINIC VISIT: HCPCS

## 2021-09-07 RX ORDER — GADOBUTROL 604.72 MG/ML
7.5 INJECTION INTRAVENOUS ONCE
Status: COMPLETED | OUTPATIENT
Start: 2021-09-07 | End: 2021-09-07

## 2021-09-07 RX ADMIN — GADOBUTROL 7.5 ML: 604.72 INJECTION INTRAVENOUS at 11:17

## 2021-09-07 ASSESSMENT — PAIN SCALES - GENERAL: PAINLEVEL: NO PAIN (0)

## 2021-09-07 ASSESSMENT — MIFFLIN-ST. JEOR: SCORE: 1751.61

## 2021-09-07 NOTE — DISCHARGE INSTRUCTIONS
MRI Contrast Discharge Instructions    The IV contrast you received today will pass out of your body in your  urine. This will happen in the next 24 hours. You will not feel this process.  Your urine will not change color.    Drink at least 4 extra glasses of water or juice today (unless your doctor  has restricted your fluids). This reduces the stress on your kidneys.  You may take your regular medicines.    If you are on dialysis: It is best to have dialysis today.    If you have a reaction: Most reactions happen right away. If you have  any new symptoms after leaving the hospital (such as hives or swelling),  call your hospital at the correct number below. Or call your family doctor.  If you have breathing distress or wheezing, call 911.    Special instructions: ***    I have read and understand the above information.    Signature:______________________________________ Date:___________    Staff:__________________________________________ Date:___________     Time:__________    [facility] Departments:    ___[place]: [phone]  ___[facility]: [phone]  ___[place]: [phone] ___[facility]: [phone]  ___[facility]: [phone]  ___[facility]: [phone]  ___[phone]  ___[facility]: [phone]  ___[phone]

## 2021-09-13 NOTE — PROGRESS NOTES
Service Date: 09/07/2021    REASON FOR VISIT:  Real Sellers is a 36-year-old man who I follow for multiple sclerosis.  He returns for clinical followup and MRI review.  I last saw him 1 year ago and he was last seen in our clinic by Aimee Pierre in 12/2020.    HISTORY OF PRESENT ILLNESS:  Jair got his second dose of the Pfizer vaccine a few weeks ago and had fairly bad myalgias and chills with that.  Actually, he says he has not been feeling particularly well since his initial rituximab infusion in January.  He notices he has been getting sick more frequently.  He had a B-cell count checked in July and it was 48, suggesting it was time for his next infusion.  He had been holding off because of getting the COVID vaccine, but also is somewhat undecided whether he wants to stay on it.  We discussed alternatives for him.  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  Prior treatment includes glatiramer acetate (inadequate disease control), interferon beta (not tolerated due to mood issues), Tecfidera (relapse) and then the natalizumab.    I told him I think the most reasonable alternative treatment would be an S1P inhibitor (Gilenya, Mayzent, Zeposia).  Ultimately, his decision was to continue the rituximab for now and I think that is reasonable.  He has little in terms of residual symptoms other than evanescent paresthesias and fatigue, which is pretty well managed with Adderall.  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    PHYSICAL EXAMINATION:  Blood pressure 135/83, pulse 77, weight 175 pounds.  He is alert and oriented.  Affect is bright and language functions are normal.  Cranial nerves are unremarkable.  Muscle bulk, tone, strength and dexterity are normal in the arms and legs.  Light touch is intact in all 4 limbs.  Finger tapping, toe tapping and finger-nose-finger are  normal.  Gait is narrow-based and stable with normal tandem walk and negative Romberg.    We reviewed together his MRI of the brain done earlier today and compared it to the prior exam from 2019.  Again seen is a moderate burden of typical T2 hyperintense lesions including lesions in the periventricular and intermediate white matter and in the cerebellar white matter.  There are also a few lesions in the smiley.  There were no new lesions compared to the prior exam and no enhancing lesions.    IMPRESSION:  Relapsing-remitting multiple sclerosis, clinically and radiologically stable on rituximab.      I spent 34 minutes on Jair's care on the date of service, which included chart review and face-to-face time.  We mainly discussed alternatives to B-cell depletion in terms of therapy.  I told him that there were a slightly higher number of viral infections and in clinical trials of rituximab for various disorders, but in my experience, this has not been a significant problem.  I agree with the decision to continue it for now and we can consider alternatives in the future if he continues to tolerate it poorly.    PLAN:  Follow up in 1 year.    Yousuf Escobar MD        D: 2021   T: 2021   MT: KIRILL    Name:     DIEGO MARCUM  MRN:      [id number]        Account:      [id number]   :      1985           Service Date: 2021       Document: [id number]

## 2021-09-15 NOTE — TELEPHONE ENCOUNTER
New Tysabri therapy plan orders placed per Dr. Escobar; Will fax once the orders have been cosigned by Dr. Escobar; Last AURA Virus was done in February at [facility], so they should be able to see that lab result in their own system.    Maci Veloz, MS RN Care Coordinator     Yes - the patient is able to be screened

## 2021-09-16 ENCOUNTER — MYC REFILL (OUTPATIENT)
Dept: NEUROLOGY | Facility: CLINIC | Age: 36
End: 2021-09-16

## 2021-09-16 DIAGNOSIS — G35 MULTIPLE SCLEROSIS (H): ICD-10-CM

## 2021-09-17 RX ORDER — DEXTROAMPHETAMINE SACCHARATE, AMPHETAMINE ASPARTATE, DEXTROAMPHETAMINE SULFATE AND AMPHETAMINE SULFATE 2.5; 2.5; 2.5; 2.5 MG/1; MG/1; MG/1; MG/1
10 TABLET ORAL 2 TIMES DAILY
Qty: 60 TABLET | Refills: 0 | Status: SHIPPED | OUTPATIENT
Start: 2021-09-17 | End: 2021-10-15

## 2021-09-23 ENCOUNTER — INFUSION THERAPY VISIT (OUTPATIENT)
Dept: INFUSION THERAPY | Facility: CLINIC | Age: 36
End: 2021-09-23
Attending: PSYCHIATRY & NEUROLOGY
Payer: COMMERCIAL

## 2021-09-23 VITALS
DIASTOLIC BLOOD PRESSURE: 72 MMHG | RESPIRATION RATE: 16 BRPM | SYSTOLIC BLOOD PRESSURE: 145 MMHG | TEMPERATURE: 98.4 F | HEART RATE: 80 BPM | OXYGEN SATURATION: 98 %

## 2021-09-23 DIAGNOSIS — G35 MULTIPLE SCLEROSIS (H): Primary | ICD-10-CM

## 2021-09-23 PROCEDURE — 96366 THER/PROPH/DIAG IV INF ADDON: CPT

## 2021-09-23 PROCEDURE — 96365 THER/PROPH/DIAG IV INF INIT: CPT

## 2021-09-23 PROCEDURE — 250N000011 HC RX IP 250 OP 636: Performed by: PSYCHIATRY & NEUROLOGY

## 2021-09-23 PROCEDURE — 250N000013 HC RX MED GY IP 250 OP 250 PS 637: Performed by: PSYCHIATRY & NEUROLOGY

## 2021-09-23 PROCEDURE — 258N000003 HC RX IP 258 OP 636: Performed by: PSYCHIATRY & NEUROLOGY

## 2021-09-23 PROCEDURE — 96375 TX/PRO/DX INJ NEW DRUG ADDON: CPT

## 2021-09-23 RX ORDER — EPINEPHRINE 1 MG/ML
0.3 INJECTION, SOLUTION INTRAMUSCULAR; SUBCUTANEOUS EVERY 5 MIN PRN
Status: CANCELLED | OUTPATIENT
Start: 2021-09-23

## 2021-09-23 RX ORDER — ACETAMINOPHEN 325 MG/1
650 TABLET ORAL ONCE
Status: CANCELLED
Start: 2021-09-23

## 2021-09-23 RX ORDER — DIPHENHYDRAMINE HCL 25 MG
50 CAPSULE ORAL ONCE
Status: CANCELLED
Start: 2021-09-23

## 2021-09-23 RX ORDER — DIPHENHYDRAMINE HCL 25 MG
50 CAPSULE ORAL ONCE
Status: COMPLETED | OUTPATIENT
Start: 2021-09-23 | End: 2021-09-23

## 2021-09-23 RX ORDER — MEPERIDINE HYDROCHLORIDE 25 MG/ML
25 INJECTION INTRAMUSCULAR; INTRAVENOUS; SUBCUTANEOUS EVERY 30 MIN PRN
Status: CANCELLED | OUTPATIENT
Start: 2021-09-23

## 2021-09-23 RX ORDER — HEPARIN SODIUM (PORCINE) LOCK FLUSH IV SOLN 100 UNIT/ML 100 UNIT/ML
5 SOLUTION INTRAVENOUS
Status: CANCELLED | OUTPATIENT
Start: 2021-09-23

## 2021-09-23 RX ORDER — METHYLPREDNISOLONE SODIUM SUCCINATE 125 MG/2ML
125 INJECTION, POWDER, LYOPHILIZED, FOR SOLUTION INTRAMUSCULAR; INTRAVENOUS
Status: CANCELLED
Start: 2021-09-23

## 2021-09-23 RX ORDER — NALOXONE HYDROCHLORIDE 0.4 MG/ML
0.2 INJECTION, SOLUTION INTRAMUSCULAR; INTRAVENOUS; SUBCUTANEOUS
Status: CANCELLED | OUTPATIENT
Start: 2021-09-23

## 2021-09-23 RX ORDER — DIPHENHYDRAMINE HYDROCHLORIDE 50 MG/ML
50 INJECTION INTRAMUSCULAR; INTRAVENOUS
Status: CANCELLED
Start: 2021-09-23

## 2021-09-23 RX ORDER — HEPARIN SODIUM,PORCINE 10 UNIT/ML
5 VIAL (ML) INTRAVENOUS
Status: CANCELLED | OUTPATIENT
Start: 2021-09-23

## 2021-09-23 RX ORDER — ALBUTEROL SULFATE 0.83 MG/ML
2.5 SOLUTION RESPIRATORY (INHALATION)
Status: CANCELLED | OUTPATIENT
Start: 2021-09-23

## 2021-09-23 RX ORDER — ACETAMINOPHEN 325 MG/1
650 TABLET ORAL ONCE
Status: COMPLETED | OUTPATIENT
Start: 2021-09-23 | End: 2021-09-23

## 2021-09-23 RX ORDER — METHYLPREDNISOLONE SODIUM SUCCINATE 125 MG/2ML
125 INJECTION, POWDER, LYOPHILIZED, FOR SOLUTION INTRAMUSCULAR; INTRAVENOUS ONCE
Status: COMPLETED | OUTPATIENT
Start: 2021-09-23 | End: 2021-09-23

## 2021-09-23 RX ORDER — METHYLPREDNISOLONE SODIUM SUCCINATE 125 MG/2ML
125 INJECTION, POWDER, LYOPHILIZED, FOR SOLUTION INTRAMUSCULAR; INTRAVENOUS ONCE
Status: CANCELLED
Start: 2021-09-23

## 2021-09-23 RX ORDER — ALBUTEROL SULFATE 90 UG/1
1-2 AEROSOL, METERED RESPIRATORY (INHALATION)
Status: CANCELLED
Start: 2021-09-23

## 2021-09-23 RX ADMIN — RITUXIMAB-ABBS 500 MG: 10 INJECTION, SOLUTION INTRAVENOUS at 09:24

## 2021-09-23 RX ADMIN — DIPHENHYDRAMINE HYDROCHLORIDE 50 MG: 25 CAPSULE ORAL at 08:50

## 2021-09-23 RX ADMIN — ACETAMINOPHEN 650 MG: 325 TABLET ORAL at 08:50

## 2021-09-23 RX ADMIN — METHYLPREDNISOLONE SODIUM SUCCINATE 125 MG: 125 INJECTION, POWDER, FOR SOLUTION INTRAMUSCULAR; INTRAVENOUS at 08:52

## 2021-09-23 NOTE — LETTER
9/23/2021         RE: Real Sellers  [address]  [place] [address]        Dear Colleague,    Thank you for referring your patient, Real Sellers, to the [facility]. Please see a copy of my visit note below.    Nursing Note  Real Sellers presents [facility] for:   Chief Complaint   Patient presents with     Infusion     Rituxan     During [facility]'s [facility] appointment, orders from Dr. Escobar were completed.  Frequency: once.    Progress note:  Patient identification verified by name and date of birth.  Assessment completed.  Vitals recorded in Doc Flowsheets.  Patient was provided with education regarding medication/procedure and possible side effects.  Patient verbalized understanding.     present during visit today: Not Applicable.    Treatment Conditions: ~~~ NOTE: If the patient answers yes to any of the questions below, hold the infusion and contact ordering provider or on-call provider.    1. Have you recently had an elevated temperature, fever, chills, productive cough, coughing for 3 weeks or longer or hemoptysis, abnormal vital signs, night sweats,  chest pain or have you noticed a decrease in your appetite, unexplained weight loss or fatigue? No  2. Do you have any open wounds or new incisions? No  3. Do you have any recent or upcoming hospitalizations, surgeries or dental procedures? No  4. Do you currently have or recently have had any signs of illness or infection or are you on any antibiotics? No  5. Have you had any new, sudden or worsening abdominal pain? No  6. Have you or anyone in your household received a live vaccination in the past 4 weeks? Please note:  No live vaccines while on biologic/chemotherapy until 6 months after the last treatment.  Patient can receive the flu vaccine (shot only) and the pneumovax.  It is  optimal for the patient to get these vaccines mid cycle, but they can be given at any time as long as it is not on the day of the infusion. No  7. Have you recently been diagnosed with any new nervous system diseases (ie. Multiple sclerosis, Guillain [place], seizures, neurological changes) or cancer diagnosis? No  8. Are you on any form of radiation or chemotherapy? No  9. Are you pregnant or breast feeding or do you have plans of pregnancy in the future? No  10. Have you been having any signs of worsening depression or suicidal ideations?  (benlysta only) NA  11. Have there been any other new onset medical symptoms? No  Hep B surface antigen and core antibody non-reactive on 12/15/2020.     Premedications: administered per order.    Drug Waste Record: No    Infusion length and rate:   Infusion starts at 100 ml/hr, then increased by 100 ml/hr every 30 minutes to final rate of 400 ml/hr. Total approximate infusion time of 2 hours.    Labs: were not ordered for this appointment.    Vascular access: peripheral IV placed today.    Is the next appt scheduled? NA  Asymptomatic COVID test completed? NA    Post Infusion Assessment:  Patient tolerated infusion without incident.     Discharge Plan:   Follow up plan of care with: ongoing infusions at [facility]., ordering provider as scheduled. and after visit summary declined by patient  Discharge instructions were reviewed with patient.  Patient/representative verbalized understanding of discharge instructions and all questions answered.  Patient discharged from [facility] in stable condition.    Carmen Breaux RN       Administrations This Visit     acetaminophen (TYLENOL) tablet 650 mg     Admin Date  09/23/2021 Action  Given Dose  650 mg Route  Oral Administered By  Carmen Breaux RN          diphenhydrAMINE (BENADRYL) capsule 50 mg     Admin Date  09/23/2021 Action  Given Dose  50 mg Route  Oral Administered  By  Carmen Breaux, RN          methylPREDNISolone sodium succinate (solu-MEDROL) injection 125 mg     Admin Date  09/23/2021 Action  Given Dose  125 mg Route  Intravenous Administered By  Carmen Breaux, RN          riTUXimab-abbs (TRUXIMA) 500 mg in sodium chloride 0.9 % 500 mL infusion for NON-oncology use     Admin Date  09/23/2021 Action  New Bag Dose  500 mg Route  Intravenous Administered By  aCrmen Breaux, RN                BP (!) 145/72   Pulse 80   Temp 98.4  F (36.9  C) (Oral)   Resp 16   SpO2 98%         Again, thank you for allowing me to participate in the care of your patient.        Sincerely,        [facility] [place]

## 2021-09-23 NOTE — PATIENT INSTRUCTIONS
Dear Real Sellers    Thank you for choosing [facility] ([facility]) for your Rituxan infusion.  The following information is a summary of our appointment as well as important reminders.      Patient Education     Rituximab Solution for injection  What is this medicine?  RITUXIMAB (ri TUX i mab) is a monoclonal antibody. This medicine changes the way the body's immune system works. It is used commonly to treat non-Hodgkin lymphoma and other conditions. In cancer cells, this drug targets a specific protein within cancer cells and stops the cancer cells from growing. It is also used to treat rheumatoid arthritis (RA). In RA, this medicine slows the inflammatory process and help reduce joint pain and swelling. This medicine is often used with other cancer or arthritis medications.  This medicine may be used for other purposes; ask your health care provider or pharmacist if you have questions.  What should I tell my health care provider before I take this medicine?  They need to know if you have any of these conditions:    blood disorders    heart disease    history of hepatitis B    infection (especially a virus infection such as chickenpox, cold sores, or herpes)    irregular heartbeat    kidney disease    lung or breathing disease, like asthma    lupus    an unusual or allergic reaction to rituximab, mouse proteins, other medicines, foods, dyes, or preservatives    pregnant or trying to get pregnant    breast-feeding  How should I use this medicine?  This medicine is for infusion into a vein. It is administered in a hospital or clinic by a specially trained health care professional.  A special MedGuide will be given to you by the pharmacist with each prescription and refill. Be sure to read this information carefully each time.  Talk to your pediatrician regarding the use of this medicine in children. This medicine is not approved for use in  children.  Overdosage: If you think you have taken too much of this medicine contact a poison control center or emergency room at once.  NOTE: This medicine is only for you. Do not share this medicine with others.  What if I miss a dose?  It is important not to miss a dose. Call your doctor or health care professional if you are unable to keep an appointment.  What may interact with this medicine?    cisplatin    medicines for blood pressure    some other medicines for arthritis    vaccines  This list may not describe all possible interactions. Give your health care provider a list of all the medicines, herbs, non-prescription drugs, or dietary supplements you use. Also tell them if you smoke, drink alcohol, or use illegal drugs. Some items may interact with your medicine.  What should I watch for while using this medicine?  Report any side effects that you notice during your treatment right away, such as changes in your breathing, fever, chills, dizziness or lightheadedness. These effects are more common with the first dose.  Visit your prescriber or health care professional for checks on your progress. You will need to have regular blood work. Report any other side effects. The side effects of this medicine can continue after you finish your treatment. Continue your course of treatment even though you feel ill unless your doctor tells you to stop.  Call your doctor or health care professional for advice if you get a fever, chills or sore throat, or other symptoms of a cold or flu. Do not treat yourself. This drug decreases your body's ability to fight infections. Try to avoid being around people who are sick.  This medicine may increase your risk to bruise or bleed. Call your doctor or health care professional if you notice any unusual bleeding.  Be careful brushing and flossing your teeth or using a toothpick because you may get an infection or bleed more easily. If you have any dental work done, tell your  dentist you are receiving this medicine.  Avoid taking products that contain aspirin, acetaminophen, ibuprofen, naproxen, or ketoprofen unless instructed by your doctor. These medicines may hide a fever.  Do not become pregnant while taking this medicine. Women should inform their doctor if they wish to become pregnant or think they might be pregnant. There is a potential for serious side effects to an unborn child. Talk to your health care professional or pharmacist for more information. Do not breast-feed an infant while taking this medicine.  What side effects may I notice from receiving this medicine?  Side effects that you should report to your doctor or health care professional as soon as possible:    allergic reactions like skin rash, itching or hives, swelling of the face, lips, or tongue    low blood counts - this medicine may decrease the number of white blood cells, red blood cells and platelets. You may be at increased risk for infections and bleeding.    signs of infection - fever or chills, cough, sore throat, pain or difficulty passing urine    signs of decreased platelets or bleeding - bruising, pinpoint red spots on the skin, black, tarry stools, blood in the urine    signs of decreased red blood cells - unusually weak or tired, fainting spells, lightheadedness    breathing problems    confused, not responsive    chest pain    fast, irregular heartbeat    feeling faint or lightheaded, falls    mouth sores    redness, blistering, peeling or loosening of the skin, including inside the mouth    stomach pain    swelling of the ankles, feet, or hands    trouble passing urine or change in the amount of urine  Side effects that usually do not require medical attention (report to your doctor or other health care professional if they continue or are bothersome):    anxiety    headache    loss of appetite    muscle aches    nausea    night sweats  This list may not describe all possible side effects. Call  your doctor for medical advice about side effects. You may report side effects to FDA at 1-536-SBM-7424.  Where should I keep my medicine?  This drug is given in a hospital or clinic and will not be stored at home.  NOTE:This sheet is a summary. It may not cover all possible information. If you have questions about this medicine, talk to your doctor, pharmacist, or health care provider. Copyright  2016 Gold Standard    EDUCATION POST BIOLOGICAL/CHEMOTHERAPY INFUSION  Call the triage nurse at your clinic or seek medical attention if you have chills and/or temperature greater than or equal to 100.5, uncontrolled nausea/vomiting, diarrhea, constipation, dizziness, shortness of breath, chest pain, heart palpitations, weakness or any other new or concerning symptoms, questions or concerns.  You can not have any live virus vaccines prior to or during treatment or up to 6 months post infusion.  If you have an upcoming surgery, medical procedure or dental procedure during treatment, this should be discussed with your ordering physician and your surgeon/dentist.  If you are having any concerning symptom, if you are unsure if you should get your next infusion or wish to speak to a provider before your next infusion, please call your care coordinator or triage nurse at your clinic to notify them so we can adequately serve you.         We look forward in seeing you on your next appointment here at [facility] ([facility]).  Please don t hesitate to call us at [phone] to reschedule any of your appointments or to speak with one of the [facility] registered nurses.  It was a pleasure taking care of you today.    Sincerely,    [facility]  [facility]  [address] [place] [address]  [id number]  Phone:  [phone]

## 2021-09-23 NOTE — PROGRESS NOTES
Nursing Note  Real Sellers presents today to [facility] for:   Chief Complaint   Patient presents with     Infusion     Rituxan     During today's [facility] appointment, orders from Dr. Escobar were completed.  Frequency: once.    Progress note:  Patient identification verified by name and date of birth.  Assessment completed.  Vitals recorded in Doc Flowsheets.  Patient was provided with education regarding medication/procedure and possible side effects.  Patient verbalized understanding.     present during visit today: Not Applicable.    Treatment Conditions: ~~~ NOTE: If the patient answers yes to any of the questions below, hold the infusion and contact ordering provider or on-call provider.    1. Have you recently had an elevated temperature, fever, chills, productive cough, coughing for 3 weeks or longer or hemoptysis, abnormal vital signs, night sweats,  chest pain or have you noticed a decrease in your appetite, unexplained weight loss or fatigue? No  2. Do you have any open wounds or new incisions? No  3. Do you have any recent or upcoming hospitalizations, surgeries or dental procedures? No  4. Do you currently have or recently have had any signs of illness or infection or are you on any antibiotics? No  5. Have you had any new, sudden or worsening abdominal pain? No  6. Have you or anyone in your household received a live vaccination in the past 4 weeks? Please note:  No live vaccines while on biologic/chemotherapy until 6 months after the last treatment.  Patient can receive the flu vaccine (shot only) and the pneumovax.  It is optimal for the patient to get these vaccines mid cycle, but they can be given at any time as long as it is not on the day of the infusion. No  7. Have you recently been diagnosed with any new nervous system diseases (ie. Multiple sclerosis, Guillain Hammond, seizures, neurological changes) or cancer  diagnosis? No  8. Are you on any form of radiation or chemotherapy? No  9. Are you pregnant or breast feeding or do you have plans of pregnancy in the future? No  10. Have you been having any signs of worsening depression or suicidal ideations?  (benlysta only) NA  11. Have there been any other new onset medical symptoms? No  Hep B surface antigen and core antibody non-reactive on 12/15/2020.     Premedications: administered per order.    Drug Waste Record: No    Infusion length and rate:   Infusion starts at 100 ml/hr, then increased by 100 ml/hr every 30 minutes to final rate of 400 ml/hr. Total approximate infusion time of 2 hours.    Labs: were not ordered for this appointment.    Vascular access: peripheral IV placed today.    Is the next appt scheduled? NA  Asymptomatic COVID test completed? NA    Post Infusion Assessment:  Patient tolerated infusion without incident.     Discharge Plan:   Follow up plan of care with: ongoing infusions at [facility]., ordering provider as scheduled. and after visit summary declined by patient  Discharge instructions were reviewed with patient.  Patient/representative verbalized understanding of discharge instructions and all questions answered.  Patient discharged from [facility] in stable condition.    Carmen Breaux RN       Administrations This Visit     acetaminophen (TYLENOL) tablet 650 mg     Admin Date  09/23/2021 Action  Given Dose  650 mg Route  Oral Administered By  Carmen Breaux RN          diphenhydrAMINE (BENADRYL) capsule 50 mg     Admin Date  09/23/2021 Action  Given Dose  50 mg Route  Oral Administered By  Carmen Breaux RN          methylPREDNISolone sodium succinate (solu-MEDROL) injection 125 mg     Admin Date  09/23/2021 Action  Given Dose  125 mg Route  Intravenous Administered By  Carmen Breaux RN          riTUXimab-abbs (TRUXIMA) 500 mg in sodium chloride 0.9 % 500 mL infusion for  NON-oncology use     Admin Date  09/23/2021 Action  New Bag Dose  500 mg Route  Intravenous Administered By  Carmen Breaux, RN                BP (!) 145/72   Pulse 80   Temp 98.4  F (36.9  C) (Oral)   Resp 16   SpO2 98%

## 2021-10-15 ENCOUNTER — MYC REFILL (OUTPATIENT)
Dept: NEUROLOGY | Facility: CLINIC | Age: 36
End: 2021-10-15

## 2021-10-15 DIAGNOSIS — G35 MULTIPLE SCLEROSIS (H): ICD-10-CM

## 2021-10-15 RX ORDER — DEXTROAMPHETAMINE SACCHARATE, AMPHETAMINE ASPARTATE, DEXTROAMPHETAMINE SULFATE AND AMPHETAMINE SULFATE 2.5; 2.5; 2.5; 2.5 MG/1; MG/1; MG/1; MG/1
10 TABLET ORAL 2 TIMES DAILY
Qty: 60 TABLET | Refills: 0 | Status: SHIPPED | OUTPATIENT
Start: 2021-10-15 | End: 2021-11-17

## 2021-10-15 NOTE — TELEPHONE ENCOUNTER
Patient requesting refill of their Adderall; Patient was last seen in September and has follow up appointment next September with Dr. Escobar. Pended rx to Dr. Escobar for signature and will send electronically to the pharmacy once signed.    Marysol Cortez RN

## 2021-10-23 ENCOUNTER — HEALTH MAINTENANCE LETTER (OUTPATIENT)
Age: 36
End: 2021-10-23

## 2021-11-09 ENCOUNTER — APPOINTMENT (OUTPATIENT)
Dept: URBAN - METROPOLITAN AREA CLINIC 252 | Age: 36
Setting detail: DERMATOLOGY
End: 2021-11-10

## 2021-11-09 VITALS — WEIGHT: 165 LBS | RESPIRATION RATE: 16 BRPM | HEIGHT: 71 IN

## 2021-11-09 DIAGNOSIS — L63.8 OTHER ALOPECIA AREATA: ICD-10-CM

## 2021-11-09 DIAGNOSIS — L64.8 OTHER ANDROGENIC ALOPECIA: ICD-10-CM

## 2021-11-09 PROCEDURE — OTHER COUNSELING: OTHER

## 2021-11-09 PROCEDURE — 11900 INJECT SKIN LESIONS </W 7: CPT

## 2021-11-09 PROCEDURE — 99213 OFFICE O/P EST LOW 20 MIN: CPT | Mod: 25

## 2021-11-09 PROCEDURE — OTHER INTRALESIONAL KENALOG: OTHER

## 2021-11-09 ASSESSMENT — LOCATION SIMPLE DESCRIPTION DERM
LOCATION SIMPLE: POSTERIOR NECK
LOCATION SIMPLE: LEFT SCALP
LOCATION SIMPLE: HAIR

## 2021-11-09 ASSESSMENT — LOCATION ZONE DERM
LOCATION ZONE: SCALP
LOCATION ZONE: NECK

## 2021-11-09 ASSESSMENT — LOCATION DETAILED DESCRIPTION DERM
LOCATION DETAILED: LEFT MEDIAL FRONTAL SCALP
LOCATION DETAILED: HAIR
LOCATION DETAILED: RIGHT SUPERIOR POSTERIOR NECK

## 2021-11-09 NOTE — HPI: HAIR LOSS (ALOPECIA AREATA)
Is This A New Presentation, Or A Follow-Up?: Hair Loss
Additional History: Patient has history of alopecia areata and androgenentic alopecia used Kenalog 2mg/mL this past just. He has Multiple Sclerosis and gets alopecia after Multiple Sclerosis flares.  Minoxidil was recommended at last office visit. Has. Ot yet started minoxidil for androgenetic alopecia.

## 2021-11-09 NOTE — PROCEDURE: COUNSELING
Patient Specific Counseling (Will Not Stick From Patient To Patient): - Discussed and recommended intralesional Kenalog injection.\\n- Advised that a series of injections may be necessary and are generally spaced out monthly until hair regrowth is achieved.
Patient Specific Counseling (Will Not Stick From Patient To Patient): Discussed finasteride but patient declined today and wishes to start with minoxidil
Detail Level: Zone
Detail Level: Detailed

## 2021-11-17 ENCOUNTER — MYC REFILL (OUTPATIENT)
Dept: NEUROLOGY | Facility: CLINIC | Age: 36
End: 2021-11-17
Payer: COMMERCIAL

## 2021-11-17 DIAGNOSIS — G35 MULTIPLE SCLEROSIS (H): ICD-10-CM

## 2021-11-18 RX ORDER — DEXTROAMPHETAMINE SACCHARATE, AMPHETAMINE ASPARTATE, DEXTROAMPHETAMINE SULFATE AND AMPHETAMINE SULFATE 2.5; 2.5; 2.5; 2.5 MG/1; MG/1; MG/1; MG/1
10 TABLET ORAL 2 TIMES DAILY
Qty: 60 TABLET | Refills: 0 | Status: SHIPPED | OUTPATIENT
Start: 2021-11-18 | End: 2021-12-15

## 2021-12-15 ENCOUNTER — MYC REFILL (OUTPATIENT)
Dept: NEUROLOGY | Facility: CLINIC | Age: 36
End: 2021-12-15
Payer: COMMERCIAL

## 2021-12-15 DIAGNOSIS — G35 MULTIPLE SCLEROSIS (H): ICD-10-CM

## 2021-12-16 RX ORDER — DEXTROAMPHETAMINE SACCHARATE, AMPHETAMINE ASPARTATE, DEXTROAMPHETAMINE SULFATE AND AMPHETAMINE SULFATE 2.5; 2.5; 2.5; 2.5 MG/1; MG/1; MG/1; MG/1
10 TABLET ORAL 2 TIMES DAILY
Qty: 60 TABLET | Refills: 0 | Status: SHIPPED | OUTPATIENT
Start: 2021-12-16 | End: 2022-01-14

## 2022-01-11 ENCOUNTER — APPOINTMENT (OUTPATIENT)
Dept: URBAN - METROPOLITAN AREA CLINIC 252 | Age: 37
Setting detail: DERMATOLOGY
End: 2022-01-11

## 2022-01-11 VITALS — HEIGHT: 70 IN | WEIGHT: 170 LBS | RESPIRATION RATE: 16 BRPM

## 2022-01-11 DIAGNOSIS — L63.8 OTHER ALOPECIA AREATA: ICD-10-CM

## 2022-01-11 PROCEDURE — OTHER INTRALESIONAL KENALOG: OTHER

## 2022-01-11 PROCEDURE — 11901 INJECT SKIN LESIONS >7: CPT

## 2022-01-11 PROCEDURE — OTHER COUNSELING: OTHER

## 2022-01-11 ASSESSMENT — LOCATION DETAILED DESCRIPTION DERM
LOCATION DETAILED: MID-OCCIPITAL SCALP
LOCATION DETAILED: RIGHT LATERAL SUPERIOR TARSAL REGION
LOCATION DETAILED: RIGHT SUPERIOR POSTERIOR NECK
LOCATION DETAILED: RIGHT INFERIOR OCCIPITAL SCALP
LOCATION DETAILED: RIGHT MEDIAL SUPERIOR TARSAL REGION
LOCATION DETAILED: RIGHT OCCIPITAL SCALP
LOCATION DETAILED: RIGHT TARSAL MARGIN OF THE INFERIOR EYELID
LOCATION DETAILED: LEFT INFERIOR OCCIPITAL SCALP
LOCATION DETAILED: LEFT MEDIAL SUPERIOR TARSAL REGION
LOCATION DETAILED: LEFT LATERAL SUPERIOR TARSAL REGION

## 2022-01-11 ASSESSMENT — LOCATION SIMPLE DESCRIPTION DERM
LOCATION SIMPLE: RIGHT LATERAL SUPERIOR TARSAL REGION
LOCATION SIMPLE: POSTERIOR SCALP
LOCATION SIMPLE: LEFT MEDIAL SUPERIOR TARSAL REGION
LOCATION SIMPLE: LEFT LATERAL SUPERIOR TARSAL REGION
LOCATION SIMPLE: RIGHT LOWER LID TARSAL MARGIN
LOCATION SIMPLE: RIGHT MEDIAL SUPERIOR TARSAL REGION
LOCATION SIMPLE: POSTERIOR NECK

## 2022-01-11 ASSESSMENT — LOCATION ZONE DERM
LOCATION ZONE: EYELID
LOCATION ZONE: SCALP
LOCATION ZONE: NECK

## 2022-01-11 NOTE — HPI: HAIR LOSS (ALOPECIA AREATA)
Is This A New Presentation, Or A Follow-Up?: Follow Up Alopecia Areata
Additional History: Improved with Kenalog 2mg/mL on back of scalp but it fell pit again. Denies indentation. Patient would like to do eyelids today as well.

## 2022-01-11 NOTE — PROCEDURE: COUNSELING
Detail Level: Detailed
Patient Specific Counseling (Will Not Stick From Patient To Patient): - Discussed and recommended intralesional Kenalog injection.\\n- Advised that a series of injections may be necessary and are generally spaced out monthly until hair regrowth is achieved.

## 2022-01-14 ENCOUNTER — MYC REFILL (OUTPATIENT)
Dept: NEUROLOGY | Facility: CLINIC | Age: 37
End: 2022-01-14
Payer: COMMERCIAL

## 2022-01-14 DIAGNOSIS — G35 MULTIPLE SCLEROSIS (H): ICD-10-CM

## 2022-01-14 RX ORDER — DEXTROAMPHETAMINE SACCHARATE, AMPHETAMINE ASPARTATE, DEXTROAMPHETAMINE SULFATE AND AMPHETAMINE SULFATE 2.5; 2.5; 2.5; 2.5 MG/1; MG/1; MG/1; MG/1
10 TABLET ORAL 2 TIMES DAILY
Qty: 60 TABLET | Refills: 0 | Status: SHIPPED | OUTPATIENT
Start: 2022-01-14 | End: 2022-02-20

## 2022-01-14 NOTE — TELEPHONE ENCOUNTER
Patient requesting refill of their Adderall; Patient was last seen in September 2021 and has follow up appointment in September 2022 with Dr Escobar. Pended rx to Dr Escobar for signature and will send electronically to the pharmacy once signed.    Araceli Penn RN

## 2022-01-20 ENCOUNTER — TELEPHONE (OUTPATIENT)
Dept: NEUROLOGY | Facility: CLINIC | Age: 37
End: 2022-01-20
Payer: COMMERCIAL

## 2022-01-20 NOTE — TELEPHONE ENCOUNTER
Prior Authorization Specialty Medication Request    Medication/Dose: Zeposia 0.92 mg capsules  ICD code (if different than what is on RX):  Multiple Sclerosis, G35  Previously Tried and Failed:  Rituximab, Copaxone/glatiramer acetate, Rebif, Tecfidera/dimethyl fumarate, Tysabri    Important Lab Values:   Rationale: Continuation of disease modifying therapy for demyelinating disease, please approve    Insurance Name: [employer]  Insurance ID: [id number]  Insurance Phone Number: [phone]    Pharmacy Information (if different than what is on RX)  Name:    Phone:

## 2022-01-21 NOTE — TELEPHONE ENCOUNTER
PA Initiation    Medication: Zeposia--Initiated  Insurance Company: [employer] - Phone [phone] Fax [phone]  Pharmacy Filling the Rx:    Filling Pharmacy Phone:    Filling Pharmacy Fax:    Start Date: 1/21/2022    KEY:[id number]

## 2022-01-24 NOTE — TELEPHONE ENCOUNTER
Prior Authorization Approval    Authorization Effective Date: 1/1/2022  Authorization Expiration Date: 1/22/2023  Medication: Zeposia--APPROVED  Approved Dose/Quantity: UD  Reference #: [id number]   Insurance Company: [employer] - Phone [phone] Fax [phone]  Expected CoPay:   $1,546.44  CoPay Card Available:    Yes  Foundation Assistance Needed:    Which Pharmacy is filling the prescription (Not needed for infusion/clinic administered):    Pharmacy Notified:    Patient Notified:

## 2022-02-02 NOTE — TELEPHONE ENCOUNTER
Esthela from Zeposia support program called asking to talk with Marysol for additional information and status update, please call back at [phone] ext [phone] m-fri 8am-8pm

## 2022-02-03 ENCOUNTER — TELEPHONE (OUTPATIENT)
Dept: NEUROLOGY | Facility: CLINIC | Age: 37
End: 2022-02-03
Payer: COMMERCIAL

## 2022-02-03 NOTE — TELEPHONE ENCOUNTER
Spoke with [facility] and confirmed demographic information.  They have not received a PA for the starter pack.  Will follow up with pharmacy liaison.    Marysol Cortez RN

## 2022-02-03 NOTE — TELEPHONE ENCOUNTER
PA Initiation    Medication: Zeposia Starter Pack--PA Initiated  Insurance Company: [employer] - Phone [phone] Fax [phone]  Pharmacy Filling the Rx:    Filling Pharmacy Phone:    Filling Pharmacy Fax:    Start Date: 2/3/2022    KEY:[id number]

## 2022-02-03 NOTE — TELEPHONE ENCOUNTER
Prior Authorization Approval    Authorization Effective Date: 1/1/2022  Authorization Expiration Date: 1/22/2023  Medication: Zeposia Starter Pack--PA (APPROVED)  Approved Dose/Quantity: UD  Reference #: [id number]   Insurance Company: [employer] - Phone [phone] Fax [phone]  Expected CoPay:       CoPay Card Available:      Foundation Assistance Needed:    Which Pharmacy is filling the prescription (Not needed for infusion/clinic administered):    Pharmacy Notified:    Patient Notified:

## 2022-02-10 NOTE — TELEPHONE ENCOUNTER
PA for Zeposia Starter Pack was approved 2/3. I faxed the hub the approval letter and requested the RX to be sent to [facility].     Thank you,    Rachael Burch [facility]  [facility] [address] [place] [address]  Ph: [phone] Fax: [phone]  [email or web address]

## 2022-02-10 NOTE — TELEPHONE ENCOUNTER
PA for Zeposia Starter Pack was approved 2/3. I faxed the hub the approval letter and requested the RX to be sent to [facility].     Thank you,    Rachael Burch [facility]  [facility] [address] [place] [address]  Ph: [phone] Fax: [phone]  [email or web address]

## 2022-02-12 ENCOUNTER — HEALTH MAINTENANCE LETTER (OUTPATIENT)
Age: 37
End: 2022-02-12

## 2022-02-20 ENCOUNTER — MYC REFILL (OUTPATIENT)
Dept: NEUROLOGY | Facility: CLINIC | Age: 37
End: 2022-02-20
Payer: COMMERCIAL

## 2022-02-20 ENCOUNTER — TRANSFERRED RECORDS (OUTPATIENT)
Dept: HEALTH INFORMATION MANAGEMENT | Facility: CLINIC | Age: 37
End: 2022-02-20
Payer: COMMERCIAL

## 2022-02-20 DIAGNOSIS — G35 MULTIPLE SCLEROSIS (H): ICD-10-CM

## 2022-02-20 LAB
ALT SERPL-CCNC: 42 IU/L (ref 0–44)
AST SERPL-CCNC: 35 IU/L (ref 0–40)

## 2022-02-21 ENCOUNTER — TRANSFERRED RECORDS (OUTPATIENT)
Dept: HEALTH INFORMATION MANAGEMENT | Facility: CLINIC | Age: 37
End: 2022-02-21
Payer: COMMERCIAL

## 2022-02-23 RX ORDER — DEXTROAMPHETAMINE SACCHARATE, AMPHETAMINE ASPARTATE, DEXTROAMPHETAMINE SULFATE AND AMPHETAMINE SULFATE 2.5; 2.5; 2.5; 2.5 MG/1; MG/1; MG/1; MG/1
10 TABLET ORAL 2 TIMES DAILY
Qty: 60 TABLET | Refills: 0 | Status: SHIPPED | OUTPATIENT
Start: 2022-02-23 | End: 2022-03-21

## 2022-03-01 ENCOUNTER — APPOINTMENT (OUTPATIENT)
Dept: URBAN - METROPOLITAN AREA CLINIC 252 | Age: 37
Setting detail: DERMATOLOGY
End: 2022-03-01

## 2022-03-01 VITALS — WEIGHT: 160 LBS | HEIGHT: 70 IN

## 2022-03-01 DIAGNOSIS — L81.5 LEUKODERMA, NOT ELSEWHERE CLASSIFIED: ICD-10-CM

## 2022-03-01 DIAGNOSIS — L63.8 OTHER ALOPECIA AREATA: ICD-10-CM

## 2022-03-01 DIAGNOSIS — L81.4 OTHER MELANIN HYPERPIGMENTATION: ICD-10-CM

## 2022-03-01 DIAGNOSIS — D22 MELANOCYTIC NEVI: ICD-10-CM

## 2022-03-01 DIAGNOSIS — L20.89 OTHER ATOPIC DERMATITIS: ICD-10-CM

## 2022-03-01 DIAGNOSIS — Z71.89 OTHER SPECIFIED COUNSELING: ICD-10-CM

## 2022-03-01 PROBLEM — D22.71 MELANOCYTIC NEVI OF RIGHT LOWER LIMB, INCLUDING HIP: Status: ACTIVE | Noted: 2022-03-01

## 2022-03-01 PROBLEM — D22.72 MELANOCYTIC NEVI OF LEFT LOWER LIMB, INCLUDING HIP: Status: ACTIVE | Noted: 2022-03-01

## 2022-03-01 PROBLEM — D22.5 MELANOCYTIC NEVI OF TRUNK: Status: ACTIVE | Noted: 2022-03-01

## 2022-03-01 PROCEDURE — OTHER INTRALESIONAL KENALOG: OTHER

## 2022-03-01 PROCEDURE — OTHER PRESCRIPTION: OTHER

## 2022-03-01 PROCEDURE — 99214 OFFICE O/P EST MOD 30 MIN: CPT | Mod: 25

## 2022-03-01 PROCEDURE — 11901 INJECT SKIN LESIONS >7: CPT

## 2022-03-01 PROCEDURE — OTHER COUNSELING: OTHER

## 2022-03-01 RX ORDER — TRIAMCINOLONE ACETONIDE 1 MG/G
0.1% CREAM TOPICAL BID
Qty: 80 | Refills: 0 | Status: ERX | COMMUNITY
Start: 2022-03-01

## 2022-03-01 ASSESSMENT — LOCATION SIMPLE DESCRIPTION DERM
LOCATION SIMPLE: RIGHT PRETIBIAL REGION
LOCATION SIMPLE: RIGHT CALF
LOCATION SIMPLE: RIGHT THIGH
LOCATION SIMPLE: LEFT MEDIAL SUPERIOR TARSAL REGION
LOCATION SIMPLE: LEFT PRETIBIAL REGION
LOCATION SIMPLE: LEFT CALF
LOCATION SIMPLE: POSTERIOR NECK
LOCATION SIMPLE: UPPER BACK
LOCATION SIMPLE: RIGHT LATERAL SUPERIOR TARSAL REGION
LOCATION SIMPLE: POSTERIOR SCALP
LOCATION SIMPLE: RIGHT MEDIAL SUPERIOR TARSAL REGION
LOCATION SIMPLE: CHEST
LOCATION SIMPLE: LEFT LATERAL SUPERIOR TARSAL REGION
LOCATION SIMPLE: RIGHT LOWER LID TARSAL MARGIN

## 2022-03-01 ASSESSMENT — LOCATION DETAILED DESCRIPTION DERM
LOCATION DETAILED: RIGHT OCCIPITAL SCALP
LOCATION DETAILED: RIGHT DISTAL CALF
LOCATION DETAILED: LEFT LATERAL SUPERIOR TARSAL REGION
LOCATION DETAILED: RIGHT PROXIMAL PRETIBIAL REGION
LOCATION DETAILED: RIGHT MEDIAL SUPERIOR TARSAL REGION
LOCATION DETAILED: LEFT DISTAL CALF
LOCATION DETAILED: RIGHT SUPERIOR POSTERIOR NECK
LOCATION DETAILED: INFERIOR THORACIC SPINE
LOCATION DETAILED: LEFT PROXIMAL PRETIBIAL REGION
LOCATION DETAILED: RIGHT TARSAL MARGIN OF THE INFERIOR EYELID
LOCATION DETAILED: RIGHT LATERAL SUPERIOR TARSAL REGION
LOCATION DETAILED: MID-OCCIPITAL SCALP
LOCATION DETAILED: LEFT INFERIOR OCCIPITAL SCALP
LOCATION DETAILED: STERNUM
LOCATION DETAILED: RIGHT INFERIOR OCCIPITAL SCALP
LOCATION DETAILED: LEFT MEDIAL SUPERIOR TARSAL REGION
LOCATION DETAILED: RIGHT ANTERIOR PROXIMAL THIGH

## 2022-03-01 ASSESSMENT — LOCATION ZONE DERM
LOCATION ZONE: TRUNK
LOCATION ZONE: SCALP
LOCATION ZONE: EYELID
LOCATION ZONE: NECK
LOCATION ZONE: LEG

## 2022-03-01 NOTE — HPI: HAIR LOSS (ALOPECIA AREATA)
Is This A New Presentation, Or A Follow-Up?: Follow Up Alopecia Areata
Additional History: Some improvement on right post scalp but no change elsewhere.

## 2022-03-01 NOTE — PROCEDURE: COUNSELING
Detail Level: Zone
Detail Level: Simple
Detail Level: Detailed
Patient Specific Counseling (Will Not Stick From Patient To Patient): - Discussed and recommended intralesional Kenalog injection.\\n- Advised that a series of injections may be necessary and are generally spaced out monthly until hair regrowth is achieved.
Detail Level: Generalized

## 2022-03-21 ENCOUNTER — MYC REFILL (OUTPATIENT)
Dept: NEUROLOGY | Facility: CLINIC | Age: 37
End: 2022-03-21
Payer: COMMERCIAL

## 2022-03-21 DIAGNOSIS — G35 MULTIPLE SCLEROSIS (H): ICD-10-CM

## 2022-03-23 RX ORDER — DEXTROAMPHETAMINE SACCHARATE, AMPHETAMINE ASPARTATE, DEXTROAMPHETAMINE SULFATE AND AMPHETAMINE SULFATE 2.5; 2.5; 2.5; 2.5 MG/1; MG/1; MG/1; MG/1
10 TABLET ORAL 2 TIMES DAILY
Qty: 60 TABLET | Refills: 0 | Status: SHIPPED | OUTPATIENT
Start: 2022-03-23 | End: 2022-04-27

## 2022-04-27 ENCOUNTER — MYC REFILL (OUTPATIENT)
Dept: NEUROLOGY | Facility: CLINIC | Age: 37
End: 2022-04-27
Payer: COMMERCIAL

## 2022-04-27 DIAGNOSIS — G35 MULTIPLE SCLEROSIS (H): ICD-10-CM

## 2022-04-27 RX ORDER — DEXTROAMPHETAMINE SACCHARATE, AMPHETAMINE ASPARTATE, DEXTROAMPHETAMINE SULFATE AND AMPHETAMINE SULFATE 2.5; 2.5; 2.5; 2.5 MG/1; MG/1; MG/1; MG/1
10 TABLET ORAL 2 TIMES DAILY
Qty: 60 TABLET | Refills: 0 | Status: SHIPPED | OUTPATIENT
Start: 2022-04-27 | End: 2022-05-26

## 2022-05-10 ENCOUNTER — APPOINTMENT (OUTPATIENT)
Dept: URBAN - METROPOLITAN AREA CLINIC 252 | Age: 37
Setting detail: DERMATOLOGY
End: 2022-05-10

## 2022-05-10 VITALS — WEIGHT: 165 LBS | HEIGHT: 70 IN

## 2022-05-10 DIAGNOSIS — L63.8 OTHER ALOPECIA AREATA: ICD-10-CM

## 2022-05-10 PROCEDURE — OTHER COUNSELING: OTHER

## 2022-05-10 PROCEDURE — 11900 INJECT SKIN LESIONS </W 7: CPT

## 2022-05-10 PROCEDURE — OTHER INTRALESIONAL KENALOG: OTHER

## 2022-05-10 ASSESSMENT — LOCATION SIMPLE DESCRIPTION DERM
LOCATION SIMPLE: POSTERIOR SCALP
LOCATION SIMPLE: RIGHT EYEBROW

## 2022-05-10 ASSESSMENT — LOCATION DETAILED DESCRIPTION DERM
LOCATION DETAILED: RIGHT INFERIOR OCCIPITAL SCALP
LOCATION DETAILED: RIGHT CENTRAL EYEBROW
LOCATION DETAILED: MID-OCCIPITAL SCALP

## 2022-05-10 ASSESSMENT — LOCATION ZONE DERM
LOCATION ZONE: FACE
LOCATION ZONE: SCALP

## 2022-05-10 NOTE — HPI: HAIR LOSS (ALOPECIA AREATA)
Is This A New Presentation, Or A Follow-Up?: Follow Up Alopecia Areata
Additional History: Kenalog 2mg/mL used at last office visit on eyelash area and Kenalog 7.5mg/mL on scalp. No additional improvement he thinks. New eye brow loss.

## 2022-05-10 NOTE — PROCEDURE: COUNSELING
Patient Specific Counseling (Will Not Stick From Patient To Patient): - Discussed and recommended intralesional Kenalog injection.\\n- Advised that a series of injections may be necessary and are generally spaced out monthly until hair regrowth is achieved.\\n- Discussed future treatment of Xeljanz. Xeljanz is an oral medication. Discussed risks, side effects.\\n- Discussed prvious use of latisse. Patient may continue daily use of latisse to eyelashes.
Detail Level: Detailed

## 2022-05-26 ENCOUNTER — MYC REFILL (OUTPATIENT)
Dept: NEUROLOGY | Facility: CLINIC | Age: 37
End: 2022-05-26
Payer: COMMERCIAL

## 2022-05-26 DIAGNOSIS — G35 MULTIPLE SCLEROSIS (H): ICD-10-CM

## 2022-05-27 RX ORDER — DEXTROAMPHETAMINE SACCHARATE, AMPHETAMINE ASPARTATE, DEXTROAMPHETAMINE SULFATE AND AMPHETAMINE SULFATE 2.5; 2.5; 2.5; 2.5 MG/1; MG/1; MG/1; MG/1
10 TABLET ORAL 2 TIMES DAILY
Qty: 60 TABLET | Refills: 0 | Status: SHIPPED | OUTPATIENT
Start: 2022-05-27 | End: 2022-06-21

## 2022-06-21 ENCOUNTER — MYC REFILL (OUTPATIENT)
Dept: NEUROLOGY | Facility: CLINIC | Age: 37
End: 2022-06-21

## 2022-06-21 DIAGNOSIS — G35 MULTIPLE SCLEROSIS (H): ICD-10-CM

## 2022-06-21 NOTE — TELEPHONE ENCOUNTER
Patient requesting refill of their Adderall; Patient was last seen in September and has follow up appointment this September with Dr. Escobar. Pended rx to Dr. Escobar for signature and will send electronically to the pharmacy once signed.    Marysol Cortez RN

## 2022-06-21 NOTE — TELEPHONE ENCOUNTER
Received refill request for Sertraline from [facility]; Patient was last seen on 9/7/2021 and has follow up appointment on 9/13/2022 with Luz. Pended to MS pool for review/approval    Janet Eric MA

## 2022-06-22 RX ORDER — DEXTROAMPHETAMINE SACCHARATE, AMPHETAMINE ASPARTATE, DEXTROAMPHETAMINE SULFATE AND AMPHETAMINE SULFATE 2.5; 2.5; 2.5; 2.5 MG/1; MG/1; MG/1; MG/1
10 TABLET ORAL 2 TIMES DAILY
Qty: 60 TABLET | Refills: 0 | Status: SHIPPED | OUTPATIENT
Start: 2022-06-22 | End: 2022-07-25

## 2022-07-25 ENCOUNTER — MYC REFILL (OUTPATIENT)
Dept: NEUROLOGY | Facility: CLINIC | Age: 37
End: 2022-07-25

## 2022-07-25 DIAGNOSIS — G35 MULTIPLE SCLEROSIS (H): ICD-10-CM

## 2022-07-25 RX ORDER — DEXTROAMPHETAMINE SACCHARATE, AMPHETAMINE ASPARTATE, DEXTROAMPHETAMINE SULFATE AND AMPHETAMINE SULFATE 2.5; 2.5; 2.5; 2.5 MG/1; MG/1; MG/1; MG/1
10 TABLET ORAL 2 TIMES DAILY
Qty: 60 TABLET | Refills: 0 | Status: SHIPPED | OUTPATIENT
Start: 2022-07-25 | End: 2022-08-19

## 2022-08-19 ENCOUNTER — MYC REFILL (OUTPATIENT)
Dept: NEUROLOGY | Facility: CLINIC | Age: 37
End: 2022-08-19

## 2022-08-19 DIAGNOSIS — G35 MULTIPLE SCLEROSIS (H): ICD-10-CM

## 2022-08-19 NOTE — TELEPHONE ENCOUNTER
Spoke with [facility]. They do not have access to 10 mg tablets (or 20 or 30). Message sent to pt to check where he would like rx sent instead.    Araceli Penn RN

## 2022-08-22 RX ORDER — DEXTROAMPHETAMINE SACCHARATE, AMPHETAMINE ASPARTATE, DEXTROAMPHETAMINE SULFATE AND AMPHETAMINE SULFATE 1.25; 1.25; 1.25; 1.25 MG/1; MG/1; MG/1; MG/1
10 TABLET ORAL 2 TIMES DAILY
Qty: 120 TABLET | Refills: 0 | Status: SHIPPED | OUTPATIENT
Start: 2022-08-22 | End: 2022-10-05

## 2022-09-02 ENCOUNTER — TELEPHONE (OUTPATIENT)
Dept: NEUROLOGY | Facility: CLINIC | Age: 37
End: 2022-09-02

## 2022-09-02 NOTE — TELEPHONE ENCOUNTER
Central Prior Authorization Team   Phone: [phone]      PA Initiation    Medication: amphetamine-dextroamphetamine (ADDERALL) 5 MG tablet - INITIATED  Insurance Company: [employer] - Phone [phone] Fax [phone]  Pharmacy Filling the Rx: [id number] [place], [place] - [facility] AT [facility]  Filling Pharmacy Phone: [phone]  Filling Pharmacy Fax:    Start Date: 9/2/2022

## 2022-09-02 NOTE — TELEPHONE ENCOUNTER
Prior Authorization Retail Medication Request    Medication/Dose: Adderall 5 mg tablets (Take 2 tablets (10 mg) twice daily. Quantity 120 tablets for 30 days    ICD code (if different than what is on RX):  Excessive sleepiness G47.10, and Multiple Sclerosis G35  Previously Tried and Failed:    Rationale:      Insurance Name:    Insurance ID:        Pharmacy Information (if different than what is on RX)  Name:    Phone:

## 2022-09-07 NOTE — TELEPHONE ENCOUNTER
Prior Authorization Approval    Authorization Effective Date: 9/6/2022  Authorization Expiration Date: 10/6/2022  Medication: amphetamine-dextroamphetamine (ADDERALL) 5 MG tablet - APPROVED  Insurance Company: [employer] - Phone [phone] Fax [phone]  Which Pharmacy is filling the prescription (Not needed for infusion/clinic administered): [id number] [place] [address] [address] AT [facility]  Pharmacy Notified: Yes  Patient Notified: Yes (pharmacy will notify patient when ready)

## 2022-10-04 ENCOUNTER — APPOINTMENT (OUTPATIENT)
Dept: URBAN - METROPOLITAN AREA CLINIC 252 | Age: 37
Setting detail: DERMATOLOGY
End: 2022-10-04

## 2022-10-04 VITALS — HEIGHT: 60 IN | WEIGHT: 160 LBS

## 2022-10-04 DIAGNOSIS — L63.8 OTHER ALOPECIA AREATA: ICD-10-CM

## 2022-10-04 PROCEDURE — OTHER MIPS QUALITY: OTHER

## 2022-10-04 PROCEDURE — OTHER COUNSELING: OTHER

## 2022-10-04 PROCEDURE — OTHER INTRALESIONAL KENALOG: OTHER

## 2022-10-04 PROCEDURE — 99212 OFFICE O/P EST SF 10 MIN: CPT | Mod: 25

## 2022-10-04 ASSESSMENT — LOCATION SIMPLE DESCRIPTION DERM
LOCATION SIMPLE: POSTERIOR SCALP
LOCATION SIMPLE: RIGHT EYEBROW

## 2022-10-04 ASSESSMENT — LOCATION ZONE DERM
LOCATION ZONE: SCALP
LOCATION ZONE: FACE

## 2022-10-04 ASSESSMENT — LOCATION DETAILED DESCRIPTION DERM
LOCATION DETAILED: RIGHT CENTRAL EYEBROW
LOCATION DETAILED: RIGHT OCCIPITAL SCALP
LOCATION DETAILED: MID-OCCIPITAL SCALP
LOCATION DETAILED: RIGHT INFERIOR OCCIPITAL SCALP

## 2022-10-04 NOTE — HPI: HAIR LOSS (ALOPECIA AREATA)
Is This A New Presentation, Or A Follow-Up?: Hair Loss
Additional History: Has been partially responsive to intralesional Kenalog but hair loss recurs after several weeks. Patient using an immune suppressant currently for Multiple Sclerosis.

## 2022-10-04 NOTE — PROCEDURE: COUNSELING
Patient Specific Counseling (Will Not Stick From Patient To Patient): - Discussed treatment option of Olumiant.\\n- Patient on Zeposia for MS, which shows drug interaction that can cause increase of infections.\\n- Discussed risks and side effects of Olumiant.\\n- Would need blood work to start Olumiant; CMP, CBC, lipids, Hep B, QFG.\\n- Will hold off on starting Olumiant at this time.\\n- Patient has an appointment with his neurologist in the following week and will discuss use of fransisco inhibitors. \\n- Patient elects to have [place] steroid injections today.
Detail Level: Simple

## 2022-10-05 ENCOUNTER — MYC REFILL (OUTPATIENT)
Dept: NEUROLOGY | Facility: CLINIC | Age: 37
End: 2022-10-05

## 2022-10-05 DIAGNOSIS — G35 MULTIPLE SCLEROSIS (H): ICD-10-CM

## 2022-10-05 RX ORDER — DEXTROAMPHETAMINE SACCHARATE, AMPHETAMINE ASPARTATE, DEXTROAMPHETAMINE SULFATE AND AMPHETAMINE SULFATE 1.25; 1.25; 1.25; 1.25 MG/1; MG/1; MG/1; MG/1
10 TABLET ORAL 2 TIMES DAILY
Qty: 120 TABLET | Refills: 0 | Status: SHIPPED | OUTPATIENT
Start: 2022-10-05 | End: 2022-11-08 | Stop reason: DRUGHIGH

## 2022-10-05 NOTE — TELEPHONE ENCOUNTER
Patient requesting refill of their Adderall; Patient was last seen in September 2021 and has follow up appointment in March 2023 with Dr Escobar. Pended rx to Dr Escobar for signature and will send electronically to the pharmacy once signed.    Araceli Penn RN

## 2022-10-10 ENCOUNTER — HEALTH MAINTENANCE LETTER (OUTPATIENT)
Age: 37
End: 2022-10-10

## 2022-11-07 ENCOUNTER — MYC REFILL (OUTPATIENT)
Dept: NEUROLOGY | Facility: CLINIC | Age: 37
End: 2022-11-07

## 2022-11-07 DIAGNOSIS — G35 MULTIPLE SCLEROSIS (H): ICD-10-CM

## 2022-11-07 RX ORDER — DEXTROAMPHETAMINE SACCHARATE, AMPHETAMINE ASPARTATE, DEXTROAMPHETAMINE SULFATE AND AMPHETAMINE SULFATE 1.25; 1.25; 1.25; 1.25 MG/1; MG/1; MG/1; MG/1
10 TABLET ORAL 2 TIMES DAILY
Qty: 120 TABLET | Refills: 0 | Status: CANCELLED | OUTPATIENT
Start: 2022-11-07

## 2022-11-08 RX ORDER — DEXTROAMPHETAMINE SACCHARATE, AMPHETAMINE ASPARTATE, DEXTROAMPHETAMINE SULFATE AND AMPHETAMINE SULFATE 2.5; 2.5; 2.5; 2.5 MG/1; MG/1; MG/1; MG/1
10 TABLET ORAL 2 TIMES DAILY PRN
Qty: 60 TABLET | Refills: 0 | Status: SHIPPED | OUTPATIENT
Start: 2022-11-08 | End: 2022-12-05

## 2022-11-08 NOTE — TELEPHONE ENCOUNTER
Patient requesting refill of their Adderall; Patient was last seen in September 2021 and has follow up appointment in March 2023 with Dr Escobar. Pt has temporarily been on 5 mg tablets for his 10 mg BID prescription, due to supply issues with 10 mg tablets. Pt's [facility] pharmacy is now reporting they have the 10 mg tablets in stock. Rx changed back to 10 mg tablets from 5 mg tablets and pended to Dr Escobar for signature.    Araceli Penn RN

## 2022-12-05 ENCOUNTER — MYC REFILL (OUTPATIENT)
Dept: NEUROLOGY | Facility: CLINIC | Age: 37
End: 2022-12-05

## 2022-12-05 DIAGNOSIS — G35 MULTIPLE SCLEROSIS (H): ICD-10-CM

## 2022-12-05 RX ORDER — DEXTROAMPHETAMINE SACCHARATE, AMPHETAMINE ASPARTATE, DEXTROAMPHETAMINE SULFATE AND AMPHETAMINE SULFATE 2.5; 2.5; 2.5; 2.5 MG/1; MG/1; MG/1; MG/1
10 TABLET ORAL 2 TIMES DAILY PRN
Qty: 60 TABLET | Refills: 0 | Status: SHIPPED | OUTPATIENT
Start: 2022-12-05 | End: 2023-01-12

## 2022-12-05 NOTE — TELEPHONE ENCOUNTER
Patient requesting refill of their Adderall; Patient was last seen in September 2021 and has follow up appointment in March 2023 with Dr Escobar. Pended rx to Dr Escobar for signature and will send electronically to the pharmacy once signed.    Arcaeli Penn RN

## 2023-01-12 ENCOUNTER — MYC REFILL (OUTPATIENT)
Dept: NEUROLOGY | Facility: CLINIC | Age: 38
End: 2023-01-12

## 2023-01-12 DIAGNOSIS — G35 MULTIPLE SCLEROSIS (H): ICD-10-CM

## 2023-01-12 RX ORDER — DEXTROAMPHETAMINE SACCHARATE, AMPHETAMINE ASPARTATE, DEXTROAMPHETAMINE SULFATE AND AMPHETAMINE SULFATE 2.5; 2.5; 2.5; 2.5 MG/1; MG/1; MG/1; MG/1
10 TABLET ORAL 2 TIMES DAILY PRN
Qty: 60 TABLET | Refills: 0 | Status: SHIPPED | OUTPATIENT
Start: 2023-01-12 | End: 2023-02-08

## 2023-01-17 NOTE — TELEPHONE ENCOUNTER
[email or web address] message sent to Jair with Dr Escobar's input; He is scheduled for his MRIs on 3/2/17 at [facility]; Will get MRI images from them on the following day for Dr Escobar to review.    Maci Veloz, MS RN Care Coordinator     Hydroquinone Counseling:  Patient advised that medication may result in skin irritation, lightening (hypopigmentation), dryness, and burning.  In the event of skin irritation, the patient was advised to reduce the amount of the drug applied or use it less frequently.  Rarely, spots that are treated with hydroquinone can become darker (pseudoochronosis).  Should this occur, patient instructed to stop medication and call the office. The patient verbalized understanding of the proper use and possible adverse effects of hydroquinone.  All of the patient's questions and concerns were addressed.

## 2023-02-03 ENCOUNTER — APPOINTMENT (OUTPATIENT)
Dept: URBAN - METROPOLITAN AREA CLINIC 252 | Age: 38
Setting detail: DERMATOLOGY
End: 2023-02-03

## 2023-02-03 VITALS — HEIGHT: 70 IN | WEIGHT: 165 LBS

## 2023-02-03 DIAGNOSIS — L63.8 OTHER ALOPECIA AREATA: ICD-10-CM

## 2023-02-03 PROCEDURE — OTHER INTRALESIONAL KENALOG: OTHER

## 2023-02-03 PROCEDURE — OTHER COUNSELING: OTHER

## 2023-02-03 PROCEDURE — 99214 OFFICE O/P EST MOD 30 MIN: CPT | Mod: 25

## 2023-02-03 PROCEDURE — OTHER PRESCRIPTION: OTHER

## 2023-02-03 RX ORDER — TOFACITINIB 5 MG/1
TABLET, FILM COATED ORAL BID
Qty: 30 | Refills: 11 | Status: ERX | COMMUNITY
Start: 2023-02-03

## 2023-02-03 ASSESSMENT — LOCATION DETAILED DESCRIPTION DERM
LOCATION DETAILED: LEFT INFERIOR OCCIPITAL SCALP
LOCATION DETAILED: MID-OCCIPITAL SCALP
LOCATION DETAILED: RIGHT MEDIAL EYEBROW
LOCATION DETAILED: RIGHT OCCIPITAL SCALP
LOCATION DETAILED: RIGHT INFERIOR OCCIPITAL SCALP
LOCATION DETAILED: RIGHT CENTRAL EYEBROW
LOCATION DETAILED: LEFT CENTRAL EYEBROW

## 2023-02-03 ASSESSMENT — LOCATION ZONE DERM
LOCATION ZONE: FACE
LOCATION ZONE: SCALP

## 2023-02-03 ASSESSMENT — LOCATION SIMPLE DESCRIPTION DERM
LOCATION SIMPLE: POSTERIOR SCALP
LOCATION SIMPLE: LEFT EYEBROW
LOCATION SIMPLE: RIGHT EYEBROW

## 2023-02-03 NOTE — HPI: HAIR LOSS (ALOPECIA AREATA)
Is This A New Presentation, Or A Follow-Up?: Follow Up Alopecia Areata
Additional History: Getting worse. He wants to consider olumiant. The patient’s problem is exacerbating and not adequately controlled.  Improvs with intralesional Kenalog but quickly recurs.

## 2023-02-03 NOTE — PROCEDURE: COUNSELING
Detail Level: Simple
Patient Specific Counseling (Will Not Stick From Patient To Patient): - Discussed treatment option of Olumiant as previously discussed.\\n- Patient currently taking Zeposia for MS.\\n- Patient has discussed possible drug interactions with use of Zeposia and Olumiant with his neurologist.\\n- Patient’s neurologist not opposed to him trying olumian despit being on zeposia. Discussed risk of  to oversuppression and mustbweight risks and  benefits. \\n- Discussed risks and side effects of Olumiant in extensive detail. \\n- Will need need blood work to start Olumiant; CMP, CBC, lipids, Hep B, QFG.\\n- Olumiant may take up to 6 months to start noticing results.\\n- Discussed treatment option of a compounded topical called Xeljanz 2% cream. \\n- No bloodwork needed with use of topical Xeljanz.\\n- Patient expressed understanding, use bid and will repeat intralesional Kenalog again. Discussed risks in detail. Verbal informed consent was obtained today. No written informed consent was obtained in order to curtail any risk of Covid-19 and influenza transmission.

## 2023-02-06 ENCOUNTER — TELEPHONE (OUTPATIENT)
Dept: NEUROLOGY | Facility: CLINIC | Age: 38
End: 2023-02-06
Payer: COMMERCIAL

## 2023-02-06 DIAGNOSIS — G35 MULTIPLE SCLEROSIS (H): Primary | ICD-10-CM

## 2023-02-06 RX ORDER — OZANIMOD HYDROCHLORIDE 0.92 MG/1
1 CAPSULE ORAL DAILY
Qty: 30 CAPSULE | Refills: 0 | Status: SHIPPED | OUTPATIENT
Start: 2023-02-06 | End: 2023-03-06

## 2023-02-06 NOTE — TELEPHONE ENCOUNTER
Patient requesting refill of their Zeposia; Patient was last seen in September 2021 and has follow up appointment in March 2023 with Dr Escobar. Original prescription on Zeposia start form, signed by Dr Escobar 1/25/2022. Refilled for one month to last until follow-up next month, per [facility] refill protocol.     Araceli Penn RN

## 2023-02-08 ENCOUNTER — MYC REFILL (OUTPATIENT)
Dept: NEUROLOGY | Facility: CLINIC | Age: 38
End: 2023-02-08
Payer: COMMERCIAL

## 2023-02-08 DIAGNOSIS — G35 MULTIPLE SCLEROSIS (H): ICD-10-CM

## 2023-02-08 RX ORDER — DEXTROAMPHETAMINE SACCHARATE, AMPHETAMINE ASPARTATE, DEXTROAMPHETAMINE SULFATE AND AMPHETAMINE SULFATE 2.5; 2.5; 2.5; 2.5 MG/1; MG/1; MG/1; MG/1
10 TABLET ORAL 2 TIMES DAILY PRN
Qty: 60 TABLET | Refills: 0 | Status: SHIPPED | OUTPATIENT
Start: 2023-02-08 | End: 2023-02-14

## 2023-02-14 ENCOUNTER — MYC REFILL (OUTPATIENT)
Dept: NEUROLOGY | Facility: CLINIC | Age: 38
End: 2023-02-14
Payer: COMMERCIAL

## 2023-02-14 DIAGNOSIS — G35 MULTIPLE SCLEROSIS (H): ICD-10-CM

## 2023-02-14 NOTE — TELEPHONE ENCOUNTER
Patient requesting Adderall sent to different pharmacy as [facility] does not have 10 mg Adderall available; Patient was last seen in September 2021 and has follow up appointment in March 2023 with Dr Escobar. Pended rx to Dr Escobar for signature and will send electronically to the pharmacy once signed.    Araceli Penn RN

## 2023-02-15 RX ORDER — DEXTROAMPHETAMINE SACCHARATE, AMPHETAMINE ASPARTATE, DEXTROAMPHETAMINE SULFATE AND AMPHETAMINE SULFATE 2.5; 2.5; 2.5; 2.5 MG/1; MG/1; MG/1; MG/1
10 TABLET ORAL 2 TIMES DAILY PRN
Qty: 60 TABLET | Refills: 0 | Status: SHIPPED | OUTPATIENT
Start: 2023-02-15 | End: 2023-03-15

## 2023-02-18 ENCOUNTER — HEALTH MAINTENANCE LETTER (OUTPATIENT)
Age: 38
End: 2023-02-18

## 2023-03-03 DIAGNOSIS — G35 MULTIPLE SCLEROSIS (H): ICD-10-CM

## 2023-03-06 RX ORDER — OZANIMOD HYDROCHLORIDE 0.92 MG/1
1 CAPSULE ORAL DAILY
Qty: 30 CAPSULE | Refills: 11 | Status: SHIPPED | OUTPATIENT
Start: 2023-03-06 | End: 2024-03-21

## 2023-03-06 NOTE — TELEPHONE ENCOUNTER
Ozanimod HCl (ZEPOSIA) 0.92 MG CAPS      Last Written Prescription Date:  2/6/23  Last Fill Quantity: 30,   # refills: 0  Last Office Visit : 9/13/21  Future Office visit:  5/23/23    Routing refill request to provider for review/approval because:  Drug not on the [facility], [facility] or [facility] refill protocol or controlled substance  Overdue for follow-up

## 2023-03-14 ENCOUNTER — APPOINTMENT (OUTPATIENT)
Dept: URBAN - METROPOLITAN AREA CLINIC 252 | Age: 38
Setting detail: DERMATOLOGY
End: 2023-03-14

## 2023-03-14 VITALS — HEIGHT: 70 IN | WEIGHT: 165 LBS

## 2023-03-14 DIAGNOSIS — L63.8 OTHER ALOPECIA AREATA: ICD-10-CM

## 2023-03-14 PROCEDURE — 11901 INJECT SKIN LESIONS >7: CPT

## 2023-03-14 PROCEDURE — OTHER PRESCRIPTION MEDICATION MANAGEMENT: OTHER

## 2023-03-14 PROCEDURE — OTHER INTRALESIONAL KENALOG: OTHER

## 2023-03-14 PROCEDURE — OTHER COUNSELING: OTHER

## 2023-03-14 ASSESSMENT — LOCATION ZONE DERM
LOCATION ZONE: FACE
LOCATION ZONE: SCALP

## 2023-03-14 ASSESSMENT — LOCATION SIMPLE DESCRIPTION DERM
LOCATION SIMPLE: LEFT EYEBROW
LOCATION SIMPLE: POSTERIOR SCALP
LOCATION SIMPLE: RIGHT EYEBROW

## 2023-03-14 ASSESSMENT — LOCATION DETAILED DESCRIPTION DERM
LOCATION DETAILED: RIGHT LATERAL EYEBROW
LOCATION DETAILED: RIGHT OCCIPITAL SCALP
LOCATION DETAILED: LEFT MEDIAL EYEBROW
LOCATION DETAILED: RIGHT CENTRAL EYEBROW

## 2023-03-14 NOTE — PROCEDURE: INTRALESIONAL KENALOG
Kenalog Preparation: Kenalog Closure 2 Information: This tab is for additional flaps and grafts, including complex repair and grafts and complex repair and flaps. You can also specify a different location for the additional defect, if the location is the same you do not need to select a new one. We will insert the automated text for the repair you select below just as we do for solitary flaps and grafts. Please note that at this time if you select a location with a different insurance zone you will need to override the ICD10 and CPT if appropriate.

## 2023-03-14 NOTE — HPI: HAIR LOSS (ALOPECIA AREATA)
Is This A New Presentation, Or A Follow-Up?: Follow Up Alopecia Areata
Additional History: Kenalog 10mg/mL used on scalp and Kenalog 2mg/mL used on eyebrows. Improved on both areas. Denies any side effects.

## 2023-03-14 NOTE — PROCEDURE: PRESCRIPTION MEDICATION MANAGEMENT
Plan: Discussed risk of systemic effect of ILK due to dose and amount. Advised with continual use there can be more of a risk due to long term effects of systemic kenalog use. Disc risk in detail. Pt elected to proceed with inj.
Render In Strict Bullet Format?: No
Detail Level: Zone
Continue Regimen: Continue topical Xeljanz BID, using to try to maintain hair growth in areas with injections.

## 2023-03-14 NOTE — PROCEDURE: COUNSELING
Patient Specific Counseling (Will Not Stick From Patient To Patient): - Discussed and recommended intralesional Kenalog injection.\\n- Advised that a series of injections may be necessary and are generally spaced out monthly until hair regrowth is achieved.
Detail Level: Detailed

## 2023-03-15 ENCOUNTER — MYC REFILL (OUTPATIENT)
Dept: NEUROLOGY | Facility: CLINIC | Age: 38
End: 2023-03-15
Payer: COMMERCIAL

## 2023-03-15 DIAGNOSIS — G35 MULTIPLE SCLEROSIS (H): ICD-10-CM

## 2023-03-15 NOTE — TELEPHONE ENCOUNTER
Patient requesting refill of their Adderall; Patient was last seen in September 2021 and has follow up appointment in May 2023 with Dr Escobar. Pended rx request to Dr Escobar.    Araceli Penn RN

## 2023-03-16 RX ORDER — DEXTROAMPHETAMINE SACCHARATE, AMPHETAMINE ASPARTATE, DEXTROAMPHETAMINE SULFATE AND AMPHETAMINE SULFATE 2.5; 2.5; 2.5; 2.5 MG/1; MG/1; MG/1; MG/1
10 TABLET ORAL 2 TIMES DAILY PRN
Qty: 60 TABLET | Refills: 0 | Status: SHIPPED | OUTPATIENT
Start: 2023-03-16 | End: 2023-03-27 | Stop reason: ALTCHOICE

## 2023-03-27 ENCOUNTER — MYC MEDICAL ADVICE (OUTPATIENT)
Dept: NEUROLOGY | Facility: CLINIC | Age: 38
End: 2023-03-27
Payer: COMMERCIAL

## 2023-03-27 DIAGNOSIS — G35 MULTIPLE SCLEROSIS (H): ICD-10-CM

## 2023-03-27 DIAGNOSIS — R53.83 OTHER FATIGUE: Primary | ICD-10-CM

## 2023-03-29 RX ORDER — DEXTROAMPHETAMINE SACCHARATE, AMPHETAMINE ASPARTATE MONOHYDRATE, DEXTROAMPHETAMINE SULFATE AND AMPHETAMINE SULFATE 5; 5; 5; 5 MG/1; MG/1; MG/1; MG/1
20 CAPSULE, EXTENDED RELEASE ORAL DAILY
Qty: 30 CAPSULE | Refills: 0 | Status: SHIPPED | OUTPATIENT
Start: 2023-03-29 | End: 2023-04-05

## 2023-04-04 NOTE — TELEPHONE ENCOUNTER
Pt now requesting this rx sent to [facility] [place] as the previous pharmacy no longer has medication in stock. Pended to Dr Escobar for signature.    Araceli Penn RN

## 2023-04-05 RX ORDER — DEXTROAMPHETAMINE SACCHARATE, AMPHETAMINE ASPARTATE MONOHYDRATE, DEXTROAMPHETAMINE SULFATE AND AMPHETAMINE SULFATE 5; 5; 5; 5 MG/1; MG/1; MG/1; MG/1
20 CAPSULE, EXTENDED RELEASE ORAL DAILY
Qty: 30 CAPSULE | Refills: 0 | Status: SHIPPED | OUTPATIENT
Start: 2023-04-05 | End: 2023-05-02 | Stop reason: DRUGHIGH

## 2023-05-01 ENCOUNTER — MYC MEDICAL ADVICE (OUTPATIENT)
Dept: NEUROLOGY | Facility: CLINIC | Age: 38
End: 2023-05-01
Payer: COMMERCIAL

## 2023-05-01 DIAGNOSIS — G35 MULTIPLE SCLEROSIS (H): Primary | ICD-10-CM

## 2023-05-01 DIAGNOSIS — R53.83 OTHER FATIGUE: ICD-10-CM

## 2023-05-02 NOTE — TELEPHONE ENCOUNTER
"Pt requesting to go back to Adderall 10 mg BID (from 20 mg XR daily). Reports headache and \"feels weird\" on the 20 mg XR. Pt was last seen Sep 2021 and hs follow-up in May 2023 with Dr Escobar. Pended request to Dr Escobar. 20 mg XR discontinued.    Araceli Penn RN    "

## 2023-05-03 RX ORDER — DEXTROAMPHETAMINE SACCHARATE, AMPHETAMINE ASPARTATE, DEXTROAMPHETAMINE SULFATE AND AMPHETAMINE SULFATE 2.5; 2.5; 2.5; 2.5 MG/1; MG/1; MG/1; MG/1
10 TABLET ORAL 2 TIMES DAILY PRN
Qty: 60 TABLET | Refills: 0 | Status: SHIPPED | OUTPATIENT
Start: 2023-05-03 | End: 2023-06-06

## 2023-05-23 ENCOUNTER — OFFICE VISIT (OUTPATIENT)
Dept: NEUROLOGY | Facility: CLINIC | Age: 38
End: 2023-05-23
Attending: PSYCHIATRY & NEUROLOGY
Payer: COMMERCIAL

## 2023-05-23 ENCOUNTER — LAB (OUTPATIENT)
Dept: LAB | Facility: CLINIC | Age: 38
End: 2023-05-23
Payer: COMMERCIAL

## 2023-05-23 VITALS
SYSTOLIC BLOOD PRESSURE: 147 MMHG | DIASTOLIC BLOOD PRESSURE: 81 MMHG | HEART RATE: 74 BPM | WEIGHT: 172.1 LBS | BODY MASS INDEX: 24.34 KG/M2 | OXYGEN SATURATION: 95 %

## 2023-05-23 DIAGNOSIS — G35 MULTIPLE SCLEROSIS (H): ICD-10-CM

## 2023-05-23 DIAGNOSIS — G35 MULTIPLE SCLEROSIS (H): Primary | ICD-10-CM

## 2023-05-23 LAB
ALT SERPL W P-5'-P-CCNC: 86 U/L (ref 10–50)
AST SERPL W P-5'-P-CCNC: 43 U/L (ref 10–50)
BASOPHILS # BLD AUTO: 0 10E3/UL (ref 0–0.2)
BASOPHILS NFR BLD AUTO: 1 %
EOSINOPHIL # BLD AUTO: 0.1 10E3/UL (ref 0–0.7)
EOSINOPHIL NFR BLD AUTO: 1 %
ERYTHROCYTE [DISTWIDTH] IN BLOOD BY AUTOMATED COUNT: 12.8 % (ref 10–15)
HCT VFR BLD AUTO: 43.5 % (ref 40–53)
HGB BLD-MCNC: 14.9 G/DL (ref 13.3–17.7)
IMM GRANULOCYTES # BLD: 0 10E3/UL
IMM GRANULOCYTES NFR BLD: 0 %
LYMPHOCYTES # BLD AUTO: 0.5 10E3/UL (ref 0.8–5.3)
LYMPHOCYTES NFR BLD AUTO: 10 %
MCH RBC QN AUTO: 29.4 PG (ref 26.5–33)
MCHC RBC AUTO-ENTMCNC: 34.3 G/DL (ref 31.5–36.5)
MCV RBC AUTO: 86 FL (ref 78–100)
MONOCYTES # BLD AUTO: 0.5 10E3/UL (ref 0–1.3)
MONOCYTES NFR BLD AUTO: 9 %
NEUTROPHILS # BLD AUTO: 4.4 10E3/UL (ref 1.6–8.3)
NEUTROPHILS NFR BLD AUTO: 79 %
NRBC # BLD AUTO: 0 10E3/UL
NRBC BLD AUTO-RTO: 0 /100
PLATELET # BLD AUTO: 207 10E3/UL (ref 150–450)
RBC # BLD AUTO: 5.06 10E6/UL (ref 4.4–5.9)
WBC # BLD AUTO: 5.5 10E3/UL (ref 4–11)

## 2023-05-23 PROCEDURE — 99214 OFFICE O/P EST MOD 30 MIN: CPT | Performed by: PSYCHIATRY & NEUROLOGY

## 2023-05-23 PROCEDURE — 85025 COMPLETE CBC W/AUTO DIFF WBC: CPT | Performed by: PATHOLOGY

## 2023-05-23 PROCEDURE — 84450 TRANSFERASE (AST) (SGOT): CPT | Performed by: PATHOLOGY

## 2023-05-23 PROCEDURE — 84460 ALANINE AMINO (ALT) (SGPT): CPT | Performed by: PATHOLOGY

## 2023-05-23 PROCEDURE — G0463 HOSPITAL OUTPT CLINIC VISIT: HCPCS | Performed by: PSYCHIATRY & NEUROLOGY

## 2023-05-23 PROCEDURE — 36415 COLL VENOUS BLD VENIPUNCTURE: CPT | Performed by: PATHOLOGY

## 2023-05-23 ASSESSMENT — PAIN SCALES - GENERAL: PAINLEVEL: NO PAIN (0)

## 2023-05-23 NOTE — Clinical Note
5/23/2023       RE: Real Sellers  [address]  [place] [address]     Dear Colleague,    Thank you for referring your patient, Real Sellers, to the [facility]. Please see a copy of my visit note below.    Malinda Miner CMA        Again, thank you for allowing me to participate in the care of your patient.      Sincerely,    Yousuf Escobar MD

## 2023-05-23 NOTE — PROGRESS NOTES
ID: Jair Sellers is a 38-year-old man who I follow for multiple sclerosis.  He returns for routine follow-up.  I last saw him in September 2021.    History of present illness: Jair has been doing very well from an MS perspective.  He was treated with natalizumab for many years but developed AURA virus antibodies.  I had switched him to rituximab but he felt worse on that, felt he was getting sick more often, and ultimately switched to ozanimod.  He feels much better on that, has no side effects and has been feeling great.  His only residual symptoms are fatigue which he manages with Adderall.  We tried the extended release formulation but he felt that made his headaches worse.  Previous treatments have included glatiramer acetate (inadequate disease control), interferon beta (worsened mood), dimethyl fumarate (relapse), natalizumab and rituximab.  We again discussed the nonzero risk of PML with medications like ozanimod.  He is comfortable with the low risk of that.  I told him I think we should use screening MRI a bit more often than I typically would, probably every 2 years.      Current Outpatient Medications:      amphetamine-dextroamphetamine (ADDERALL) 10 MG tablet, Take 1 tablet (10 mg) by mouth 2 times daily as needed (fatigue), Disp: 60 tablet, Rfl: 0     Ozanimod HCl (ZEPOSIA) 0.92 MG CAPS, Take 1 capsule by mouth daily, Disp: 30 capsule, Rfl: 11     sertraline (ZOLOFT) 50 MG tablet, Take 1 tablet (50 mg) by mouth daily, Disp: 30 tablet, Rfl: 11     Exam: He is alert and oriented.  Affect is bright and language functions are normal.  Cranial nerves are unremarkable.  Muscle bulk tone and strength and dexterity are normal in the arms and legs.  Light touch is intact in all 4 limbs.  Reflexes are normal and symmetric.  Gait is narrow based and stable with normal tandem walking negative Romberg.    Impression: Relapsing remitting multiple sclerosis, stable on ozanimod.  I spent 35 minutes on his care  today including chart review face-to-face and documentation time.  We discussed the PML issue as well as safety and MRI monitoring.    Plan:    1.  CBC with differential AST and ALT today    2.  Return in 1 year with MRI of the brain and cervical spine.

## 2023-06-06 ENCOUNTER — MYC REFILL (OUTPATIENT)
Dept: NEUROLOGY | Facility: CLINIC | Age: 38
End: 2023-06-06
Payer: COMMERCIAL

## 2023-06-06 DIAGNOSIS — G35 MULTIPLE SCLEROSIS (H): ICD-10-CM

## 2023-06-06 DIAGNOSIS — R53.83 OTHER FATIGUE: ICD-10-CM

## 2023-06-07 RX ORDER — DEXTROAMPHETAMINE SACCHARATE, AMPHETAMINE ASPARTATE, DEXTROAMPHETAMINE SULFATE AND AMPHETAMINE SULFATE 2.5; 2.5; 2.5; 2.5 MG/1; MG/1; MG/1; MG/1
10 TABLET ORAL 2 TIMES DAILY PRN
Qty: 60 TABLET | Refills: 0 | Status: SHIPPED | OUTPATIENT
Start: 2023-06-07 | End: 2023-07-19

## 2023-06-07 NOTE — TELEPHONE ENCOUNTER
Patient requesting refill of their Adderall; Patient was last seen in May 2023 and has follow up appointment in May 2024 with Dr Escobar. Pended rx to Dr Escobar for signature and will send electronically to the pharmacy once signed.    Araceli Penn RN

## 2023-06-08 ENCOUNTER — APPOINTMENT (OUTPATIENT)
Dept: URBAN - METROPOLITAN AREA CLINIC 252 | Age: 38
Setting detail: DERMATOLOGY
End: 2023-06-08

## 2023-06-08 VITALS — WEIGHT: 160 LBS | HEIGHT: 70 IN

## 2023-06-08 DIAGNOSIS — L63.8 OTHER ALOPECIA AREATA: ICD-10-CM

## 2023-06-08 PROCEDURE — 99212 OFFICE O/P EST SF 10 MIN: CPT | Mod: 25

## 2023-06-08 PROCEDURE — 11901 INJECT SKIN LESIONS >7: CPT

## 2023-06-08 PROCEDURE — OTHER INTRALESIONAL KENALOG: OTHER

## 2023-06-08 PROCEDURE — OTHER COUNSELING: OTHER

## 2023-06-08 ASSESSMENT — LOCATION SIMPLE DESCRIPTION DERM
LOCATION SIMPLE: RIGHT EYEBROW
LOCATION SIMPLE: POSTERIOR NECK
LOCATION SIMPLE: LEFT EYEBROW
LOCATION SIMPLE: POSTERIOR SCALP

## 2023-06-08 ASSESSMENT — LOCATION ZONE DERM
LOCATION ZONE: NECK
LOCATION ZONE: SCALP
LOCATION ZONE: FACE

## 2023-06-08 ASSESSMENT — LOCATION DETAILED DESCRIPTION DERM
LOCATION DETAILED: RIGHT OCCIPITAL SCALP
LOCATION DETAILED: RIGHT CENTRAL EYEBROW
LOCATION DETAILED: RIGHT MEDIAL EYEBROW
LOCATION DETAILED: LEFT MEDIAL EYEBROW
LOCATION DETAILED: LEFT SUPERIOR POSTERIOR NECK
LOCATION DETAILED: LEFT INFERIOR OCCIPITAL SCALP
LOCATION DETAILED: LEFT CENTRAL EYEBROW

## 2023-06-08 NOTE — PROCEDURE: COUNSELING
Patient Specific Counseling (Will Not Stick From Patient To Patient): - Discussed and recommended repeating ILK injections due to improvement.\\n- Pt expressed understanding and elected to proceed with inj.
Detail Level: Zone

## 2023-06-08 NOTE — PROCEDURE: INTRALESIONAL KENALOG
Consent: - Verbal and written consent was obtained prior to injection today.\\n- Discussed the potential risks and benefits of intralesional Kenalog injections including but not limited to risk of skin atrophy/indentation, unsuccessful treatment, and recurrence.\\n- Patient expressed understanding.

## 2023-06-08 NOTE — HPI: HAIR LOSS (ALOPECIA AREATA)
Is This A New Presentation, Or A Follow-Up?: Follow Up Alopecia Areata
Additional History: intralesional Kenalog 10mg in scalp and Kenalog 2mg/mL at last office visit, this worked well but then recurred.

## 2023-07-10 DIAGNOSIS — G35 MULTIPLE SCLEROSIS (H): ICD-10-CM

## 2023-07-11 ENCOUNTER — APPOINTMENT (OUTPATIENT)
Dept: URBAN - METROPOLITAN AREA CLINIC 252 | Age: 38
Setting detail: DERMATOLOGY
End: 2023-07-11

## 2023-07-11 VITALS — WEIGHT: 165 LBS | HEIGHT: 70 IN

## 2023-07-11 DIAGNOSIS — L63.8 OTHER ALOPECIA AREATA: ICD-10-CM

## 2023-07-11 PROCEDURE — 11901 INJECT SKIN LESIONS >7: CPT

## 2023-07-11 PROCEDURE — OTHER MIPS QUALITY: OTHER

## 2023-07-11 PROCEDURE — OTHER COUNSELING: OTHER

## 2023-07-11 PROCEDURE — OTHER PRESCRIPTION MEDICATION MANAGEMENT: OTHER

## 2023-07-11 PROCEDURE — OTHER INTRALESIONAL KENALOG: OTHER

## 2023-07-11 ASSESSMENT — LOCATION ZONE DERM
LOCATION ZONE: SCALP
LOCATION ZONE: FACE

## 2023-07-11 ASSESSMENT — LOCATION SIMPLE DESCRIPTION DERM
LOCATION SIMPLE: RIGHT EYEBROW
LOCATION SIMPLE: POSTERIOR SCALP

## 2023-07-11 ASSESSMENT — LOCATION DETAILED DESCRIPTION DERM
LOCATION DETAILED: RIGHT LATERAL EYEBROW
LOCATION DETAILED: RIGHT OCCIPITAL SCALP
LOCATION DETAILED: MID-OCCIPITAL SCALP

## 2023-07-11 NOTE — PROCEDURE: COUNSELING
Patient Specific Counseling (Will Not Stick From Patient To Patient): - Pt expressed understanding to risks and elected to proceed with inj.
Detail Level: Zone

## 2023-07-11 NOTE — TELEPHONE ENCOUNTER
Received refill request for sertaline from [facility]; Patient was last seen in May and has follow up appointment next May with Dr. Escobar. Refilled per [facility] refill protocol.    Marysol Cortez RN

## 2023-07-11 NOTE — HPI: HAIR LOSS (ALOPECIA AREATA)
Is This A New Presentation, Or A Follow-Up?: Follow Up Alopecia Areata
Additional History: Kenalog 10mg/mL (scalp) and Kenalog 2mg/mL (eyebrows) used at last office visit.

## 2023-07-11 NOTE — PROCEDURE: PRESCRIPTION MEDICATION MANAGEMENT
Initiate Treatment: Xeljanz 2% cream: Apply to the affected areas once daily
Render In Strict Bullet Format?: No
Detail Level: Zone
Plan: - patient had started this but only applied it for one month. Informed patient that one month is not a long enough time period for this medication to work. Patient is going to restart and apply for a longer duration of time.

## 2023-07-19 ENCOUNTER — MYC REFILL (OUTPATIENT)
Dept: NEUROLOGY | Facility: CLINIC | Age: 38
End: 2023-07-19
Payer: COMMERCIAL

## 2023-07-19 DIAGNOSIS — R53.83 OTHER FATIGUE: ICD-10-CM

## 2023-07-19 DIAGNOSIS — G35 MULTIPLE SCLEROSIS (H): ICD-10-CM

## 2023-07-19 RX ORDER — DEXTROAMPHETAMINE SACCHARATE, AMPHETAMINE ASPARTATE, DEXTROAMPHETAMINE SULFATE AND AMPHETAMINE SULFATE 2.5; 2.5; 2.5; 2.5 MG/1; MG/1; MG/1; MG/1
10 TABLET ORAL 2 TIMES DAILY PRN
Qty: 60 TABLET | Refills: 0 | Status: SHIPPED | OUTPATIENT
Start: 2023-07-19 | End: 2023-09-05

## 2023-07-19 NOTE — CONFIDENTIAL NOTE
Patient requesting refill of their Adderall; Patient was last seen in May and has follow up appointment in May with Dr. Escobar. Pended rx to Dr. Escobar for signature and will send electronically to the pharmacy once signed.    Marysol Cortez RN

## 2023-07-28 ENCOUNTER — TELEPHONE (OUTPATIENT)
Dept: NEUROLOGY | Facility: CLINIC | Age: 38
End: 2023-07-28

## 2023-07-28 DIAGNOSIS — G35 MULTIPLE SCLEROSIS (H): Primary | ICD-10-CM

## 2023-07-28 NOTE — TELEPHONE ENCOUNTER
Jair due to re-check ALT and AST at this time. Labs ordered and Jair notified to schedule lab appointment.    Marysol Cortez RN

## 2023-09-05 ENCOUNTER — MYC REFILL (OUTPATIENT)
Dept: NEUROLOGY | Facility: CLINIC | Age: 38
End: 2023-09-05
Payer: COMMERCIAL

## 2023-09-05 DIAGNOSIS — G35 MULTIPLE SCLEROSIS (H): ICD-10-CM

## 2023-09-05 DIAGNOSIS — R53.83 OTHER FATIGUE: ICD-10-CM

## 2023-09-06 ENCOUNTER — LAB (OUTPATIENT)
Dept: LAB | Facility: CLINIC | Age: 38
End: 2023-09-06
Payer: COMMERCIAL

## 2023-09-06 DIAGNOSIS — G35 MULTIPLE SCLEROSIS (H): ICD-10-CM

## 2023-09-06 PROCEDURE — 84450 TRANSFERASE (AST) (SGOT): CPT

## 2023-09-06 PROCEDURE — 84460 ALANINE AMINO (ALT) (SGPT): CPT

## 2023-09-06 PROCEDURE — 36415 COLL VENOUS BLD VENIPUNCTURE: CPT

## 2023-09-06 RX ORDER — DEXTROAMPHETAMINE SACCHARATE, AMPHETAMINE ASPARTATE, DEXTROAMPHETAMINE SULFATE AND AMPHETAMINE SULFATE 2.5; 2.5; 2.5; 2.5 MG/1; MG/1; MG/1; MG/1
10 TABLET ORAL 2 TIMES DAILY PRN
Qty: 60 TABLET | Refills: 0 | Status: SHIPPED | OUTPATIENT
Start: 2023-09-06 | End: 2023-11-08

## 2023-09-07 LAB
ALT SERPL W P-5'-P-CCNC: 109 U/L (ref 0–70)
AST SERPL W P-5'-P-CCNC: 63 U/L (ref 0–45)

## 2023-09-21 DIAGNOSIS — R94.5 ABNORMAL RESULTS OF LIVER FUNCTION STUDIES: Primary | ICD-10-CM

## 2023-09-28 ENCOUNTER — APPOINTMENT (OUTPATIENT)
Dept: URBAN - METROPOLITAN AREA CLINIC 252 | Age: 38
Setting detail: DERMATOLOGY
End: 2023-09-28

## 2023-09-28 VITALS — WEIGHT: 165 LBS | RESPIRATION RATE: 16 BRPM | HEIGHT: 70 IN

## 2023-09-28 DIAGNOSIS — L63.8 OTHER ALOPECIA AREATA: ICD-10-CM

## 2023-09-28 DIAGNOSIS — D22 MELANOCYTIC NEVI: ICD-10-CM

## 2023-09-28 DIAGNOSIS — Z71.89 OTHER SPECIFIED COUNSELING: ICD-10-CM

## 2023-09-28 DIAGNOSIS — L81.3 CAFÉ AU LAIT SPOTS: ICD-10-CM

## 2023-09-28 DIAGNOSIS — L81.5 LEUKODERMA, NOT ELSEWHERE CLASSIFIED: ICD-10-CM

## 2023-09-28 PROBLEM — D22.5 MELANOCYTIC NEVI OF TRUNK: Status: ACTIVE | Noted: 2023-09-28

## 2023-09-28 PROCEDURE — OTHER INTRALESIONAL KENALOG: OTHER

## 2023-09-28 PROCEDURE — 99213 OFFICE O/P EST LOW 20 MIN: CPT | Mod: 25

## 2023-09-28 PROCEDURE — OTHER PRESCRIPTION: OTHER

## 2023-09-28 PROCEDURE — OTHER COUNSELING: OTHER

## 2023-09-28 PROCEDURE — 11901 INJECT SKIN LESIONS >7: CPT

## 2023-09-28 RX ORDER — TACROLIMUS 1 MG/G
OINTMENT TOPICAL
Qty: 30 | Refills: 8 | Status: ERX | COMMUNITY
Start: 2023-09-28

## 2023-09-28 ASSESSMENT — LOCATION DETAILED DESCRIPTION DERM
LOCATION DETAILED: LEFT CLAVICULAR SKIN
LOCATION DETAILED: RIGHT MID-UPPER BACK
LOCATION DETAILED: RIGHT LATERAL EYEBROW
LOCATION DETAILED: LEFT INFERIOR OCCIPITAL SCALP
LOCATION DETAILED: RIGHT CENTRAL EYEBROW
LOCATION DETAILED: MID-OCCIPITAL SCALP
LOCATION DETAILED: RIGHT SUPERIOR OCCIPITAL SCALP
LOCATION DETAILED: LEFT POSTERIOR SHOULDER
LOCATION DETAILED: LEFT MID-UPPER BACK
LOCATION DETAILED: MID-FRONTAL SCALP
LOCATION DETAILED: RIGHT OCCIPITAL SCALP

## 2023-09-28 ASSESSMENT — LOCATION SIMPLE DESCRIPTION DERM
LOCATION SIMPLE: LEFT SHOULDER
LOCATION SIMPLE: ANTERIOR SCALP
LOCATION SIMPLE: RIGHT EYEBROW
LOCATION SIMPLE: POSTERIOR SCALP
LOCATION SIMPLE: LEFT UPPER BACK
LOCATION SIMPLE: LEFT CLAVICULAR SKIN
LOCATION SIMPLE: RIGHT UPPER BACK

## 2023-09-28 ASSESSMENT — LOCATION ZONE DERM
LOCATION ZONE: TRUNK
LOCATION ZONE: FACE
LOCATION ZONE: SCALP
LOCATION ZONE: ARM

## 2023-09-28 NOTE — PROCEDURE: INTRALESIONAL KENALOG
Consent: - Consent was obtained prior to injection today.\\n- Discussed the potential risks and benefits of intralesional Kenalog injections including but not limited to risk of skin atrophy/indentation, unsuccessful/unsatisfactory treatment, and recurrence.\\n- Patient expressed understanding.

## 2023-09-28 NOTE — HPI: HAIR LOSS (ALOPECIA AREATA)
Is This A New Presentation, Or A Follow-Up?: Follow Up Alopecia Areata
Additional History: Kenalog 2mg/mL in brow doesnt work as well as it use to. Kenalog 10mg/mL helps scalp but it comes and goes.

## 2023-09-28 NOTE — PROCEDURE: COUNSELING
Detail Level: Zone
Detail Level: Generalized
Patient Specific Counseling (Will Not Stick From Patient To Patient): - Discussed and recommended intralesional Kenalog injection.\\n- Advised that a series of injections may be necessary and are generally spaced out monthly until hair regrowth is achieved.

## 2023-11-01 ENCOUNTER — LAB (OUTPATIENT)
Dept: LAB | Facility: CLINIC | Age: 38
End: 2023-11-01
Payer: COMMERCIAL

## 2023-11-01 DIAGNOSIS — R94.5 ABNORMAL RESULTS OF LIVER FUNCTION STUDIES: ICD-10-CM

## 2023-11-01 LAB
ALBUMIN SERPL BCG-MCNC: 4.6 G/DL (ref 3.5–5.2)
ALP SERPL-CCNC: 96 U/L (ref 40–129)
ALT SERPL W P-5'-P-CCNC: 132 U/L (ref 0–70)
AST SERPL W P-5'-P-CCNC: 46 U/L (ref 0–45)
BILIRUB DIRECT SERPL-MCNC: 0.36 MG/DL (ref 0–0.3)
BILIRUB SERPL-MCNC: 1.7 MG/DL
PROT SERPL-MCNC: 7.5 G/DL (ref 6.4–8.3)

## 2023-11-01 PROCEDURE — 80076 HEPATIC FUNCTION PANEL: CPT

## 2023-11-01 PROCEDURE — 86704 HEP B CORE ANTIBODY TOTAL: CPT

## 2023-11-01 PROCEDURE — 36415 COLL VENOUS BLD VENIPUNCTURE: CPT

## 2023-11-01 PROCEDURE — 86803 HEPATITIS C AB TEST: CPT

## 2023-11-01 PROCEDURE — 87340 HEPATITIS B SURFACE AG IA: CPT

## 2023-11-02 DIAGNOSIS — R94.5 ABNORMAL RESULTS OF LIVER FUNCTION STUDIES: Primary | ICD-10-CM

## 2023-11-02 LAB
HBV CORE AB SERPL QL IA: NONREACTIVE
HBV SURFACE AG SERPL QL IA: NONREACTIVE
HCV AB SERPL QL IA: NONREACTIVE

## 2023-11-06 ENCOUNTER — APPOINTMENT (OUTPATIENT)
Dept: URBAN - METROPOLITAN AREA CLINIC 252 | Age: 38
Setting detail: DERMATOLOGY
End: 2023-11-06

## 2023-11-06 VITALS — WEIGHT: 165 LBS | HEIGHT: 70 IN

## 2023-11-06 DIAGNOSIS — L63.8 OTHER ALOPECIA AREATA: ICD-10-CM

## 2023-11-06 PROCEDURE — 11901 INJECT SKIN LESIONS >7: CPT

## 2023-11-06 PROCEDURE — OTHER COUNSELING: OTHER

## 2023-11-06 PROCEDURE — OTHER INTRALESIONAL KENALOG: OTHER

## 2023-11-06 ASSESSMENT — LOCATION SIMPLE DESCRIPTION DERM
LOCATION SIMPLE: POSTERIOR SCALP
LOCATION SIMPLE: RIGHT EYEBROW

## 2023-11-06 ASSESSMENT — LOCATION DETAILED DESCRIPTION DERM
LOCATION DETAILED: RIGHT INFERIOR OCCIPITAL SCALP
LOCATION DETAILED: RIGHT OCCIPITAL SCALP
LOCATION DETAILED: RIGHT MEDIAL EYEBROW

## 2023-11-06 ASSESSMENT — LOCATION ZONE DERM
LOCATION ZONE: SCALP
LOCATION ZONE: FACE

## 2023-11-06 NOTE — HPI: HAIR LOSS (ALOPECIA AREATA)
Is This A New Presentation, Or A Follow-Up?: Follow Up Alopecia Areata
Additional History: Kenalog 2mg/mL.5 used in brow and Kenalog 10mg/mL used in scalp. This has helped. Here for more injections today.

## 2023-11-06 NOTE — PROCEDURE: COUNSELING
Detail Level: Zone
Patient Specific Counseling (Will Not Stick From Patient To Patient): - Discussed and recommended intralesional Kenalog injection.\\n- Advised that a series of injections may be necessary and are generally spaced out monthly until hair regrowth is achieved.

## 2023-11-07 NOTE — CONFIDENTIAL NOTE
DIAGNOSIS:  Abnormal results of liver function studies    Appt Date:  11.21.2023   NOTES STATUS DETAILS   OFFICE NOTE from referring provider Internal 11.02.2023 Yousuf Escobar MD    OFFICE NOTES from other specialists     DISCHARGE SUMMARY from hospital     MEDICATION LIST Internal    LIVER BIOSPY (IF APPLICABLE)      PATHOLOGY REPORTS      IMAGING     ENDOSCOPY (IF AVAILABLE)     COLONOSCOPY (IF AVAILABLE)     ULTRASOUND LIVER     CT OF ABDOMEN     MRI OF LIVER     FIBROSCAN, US ELASTOGRAPHY, FIBROSIS SCAN, MR ELASTOGRAPHY     LABS     HEPATIC PANEL (LIVER PANEL) Internal 11.01.2023    BASIC METABOLIC PANEL     COMPLETE METABOLIC PANEL Internal 05.23.2023   COMPLETE BLOOD COUNT (CBC) Internal 05.23.2023   INTERNATIONAL NORMALIZED RATIO (INR)     HEPATITIS C ANTIBODY Internal 11.01.2023   HEPATITIS C VIRAL LOAD/PCR     HEPATITIS C GENOTYPE     HEPATITIS B SURFACE ANTIGEN Internal 11.01.2023   HEPATITIS B SURFACE ANTIBODY Internal 12.15.2020   HEPATITIS B DNA QUANT LEVEL     HEPATITIS B CORE ANTIBODY Internal 11.01.2023

## 2023-11-08 ENCOUNTER — MYC REFILL (OUTPATIENT)
Dept: NEUROLOGY | Facility: CLINIC | Age: 38
End: 2023-11-08
Payer: COMMERCIAL

## 2023-11-08 DIAGNOSIS — R53.83 OTHER FATIGUE: ICD-10-CM

## 2023-11-08 DIAGNOSIS — G35 MULTIPLE SCLEROSIS (H): ICD-10-CM

## 2023-11-08 RX ORDER — DEXTROAMPHETAMINE SACCHARATE, AMPHETAMINE ASPARTATE, DEXTROAMPHETAMINE SULFATE AND AMPHETAMINE SULFATE 2.5; 2.5; 2.5; 2.5 MG/1; MG/1; MG/1; MG/1
10 TABLET ORAL 2 TIMES DAILY PRN
Qty: 60 TABLET | Refills: 0 | Status: SHIPPED | OUTPATIENT
Start: 2023-11-08 | End: 2024-01-12

## 2023-11-21 ENCOUNTER — OFFICE VISIT (OUTPATIENT)
Dept: GASTROENTEROLOGY | Facility: CLINIC | Age: 38
End: 2023-11-21
Attending: PSYCHIATRY & NEUROLOGY
Payer: COMMERCIAL

## 2023-11-21 ENCOUNTER — PRE VISIT (OUTPATIENT)
Dept: GASTROENTEROLOGY | Facility: CLINIC | Age: 38
End: 2023-11-21
Payer: COMMERCIAL

## 2023-11-21 VITALS
HEART RATE: 70 BPM | OXYGEN SATURATION: 99 % | BODY MASS INDEX: 24.37 KG/M2 | WEIGHT: 172.3 LBS | SYSTOLIC BLOOD PRESSURE: 142 MMHG | DIASTOLIC BLOOD PRESSURE: 86 MMHG

## 2023-11-21 DIAGNOSIS — R94.5 ABNORMAL RESULTS OF LIVER FUNCTION STUDIES: ICD-10-CM

## 2023-11-21 PROCEDURE — 99204 OFFICE O/P NEW MOD 45 MIN: CPT | Performed by: INTERNAL MEDICINE

## 2023-11-21 PROCEDURE — 99213 OFFICE O/P EST LOW 20 MIN: CPT | Performed by: INTERNAL MEDICINE

## 2023-11-21 NOTE — LETTER
11/21/2023         RE: Real Sellers  [address]  [place] [place] [facility] [address]        Dear Colleague,    Thank you for referring your patient, Real Sellers, to the [facility] [place]. Please see a copy of my visit note below.    REASON FOR CONSULTATION: elevated liver tests  REFERRING PROVIDER: Yousuf Escobar MD [facility]    A/P  Real Sellers is a 38 year old male with elevated liver tests. He is on ozanimod for MS for roughly 2 y. Preliminary workup for elevated LFTs unrevealing. No RF for other liver disease.    Noted history of alopecia. Will complete autoimmune workup.     Ozanimod is associated with transient serum enzyme elevations during therapy but has not been linked to instances of clinically apparent liver injury with jaundice, although experience with its use has been limited.     In large controlled trials of ozanimod in patients with multiple sclerosis, serum ALT elevations were common (~5% of recipients) but were typically mild and asymptomatic, and they returned to baseline values within a few months of stopping and often even with continuation of therapy. Aminotransferase elevations above 3 times upper limit of normal were reported in 4% of ozanimod recipients compared to less than 1% of placebo recipients and elevations above 5 times ULN in 1%. There were no cases of acute hepatitis or clinically apparent liver injury but elevations in liver tests led to discontinuation in approximately 1% of subjects.Thus, mild-to-moderate and transient serum enzyme elevations during therapy are not uncommon, but clinically apparent liver injury with jaundice due to ozanimod has not been reported, although the clinical experience with its use has been limited.     With the level of transaminases Mr. Sellers currently has, based on trial data, we could conclude that for now continuing ozanimod seems reasonable. I will complete the workup  for elevated liver tests, monitor his LFTs monthly and reassess as needed.     Araceli Bronson MD  Hepatology/Liver Transplant  Medical Director, Liver Transplantation  [facility]  Real Sellers is a 38 year old male referred for elevated liver tests    He has had MS for about 20 years and has been on multiple medications. He was started on ozanimod about 18 mo ago. He was switched from his last medication due to the fact that he developed AURA virus antibodies. Ozanimod is working. He has no side effects from it.    Meds ozanimod.  No other meds/OTC/supplements    Lab Test 11/01/23  1224   PROTTOTAL 7.5   ALBUMIN 4.6   BILITOTAL 1.7*   ALKPHOS 96   AST 46*   *     Lab Test 05/23/23  0921   WBC 5.5   RBC 5.06   HGB 14.9   HCT 43.5   MCV 86   MCH 29.4   MCHC 34.3   RDW 12.8        Previous work-up:   Lab Results   Component Value Date    HEPBANG Nonreactive 11/01/2023    HBCAB Nonreactive 11/01/2023    AUSAB 19.63 (H) 12/15/2020    HCVAB Nonreactive 11/01/2023     12/15/2020        Risk factors for fatty liver disease: none  ETOH use: 1-2 beers on weekends and 1-2 during the week.  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    BP (!) 142/86   Pulse 70   Wt 78.2 kg (172 lb 4.8 oz)   SpO2 99%   BMI 24.37 kg/m      Constitutional: alert and no distress.   Neck: Neck supple. No adenopathy. Thyroid symmetric, normal size  HEENT:Normocephalic. No masses, lesions, tenderness or abnormalities. No  temporal muscle wasting ENT exam normal, no neck nodes or sinus tenderness. No oral lesions  Cardiovascular: negative, No lifts, heaves, or thrills. RRR. No murmurs, clicks gallops or rub  Respiratory: negative, Good diaphragmatic excursion. Lungs clear. No wheezes or rales  Gastrointestinal: Abdomen soft, non-tender. BS normal.  No masses, organomegaly  Skin: no suspicious lesions or rashes. No spider angiomata or palmar erythema. Nails normal.  Neurologic: Alert and oriented x3. No asterixis or tremor. Gait normal.   Psychiatric:  Appropriate, well groomed.  Hematologic/Lymphatic/Immunologic: Normal cervical and supraclavicular  lymph nodes            Again, thank you for allowing me to participate in the care of your patient.        Sincerely,        Araceli Bronson MD

## 2023-12-05 ENCOUNTER — APPOINTMENT (OUTPATIENT)
Dept: URBAN - METROPOLITAN AREA CLINIC 252 | Age: 38
Setting detail: DERMATOLOGY
End: 2023-12-05

## 2023-12-05 VITALS — HEIGHT: 70 IN | WEIGHT: 165 LBS

## 2023-12-05 DIAGNOSIS — L63.8 OTHER ALOPECIA AREATA: ICD-10-CM

## 2023-12-05 DIAGNOSIS — I78.1 NEVUS, NON-NEOPLASTIC: ICD-10-CM

## 2023-12-05 PROBLEM — D23.39 OTHER BENIGN NEOPLASM OF SKIN OF OTHER PARTS OF FACE: Status: ACTIVE | Noted: 2023-12-05

## 2023-12-05 PROCEDURE — OTHER COUNSELING: OTHER

## 2023-12-05 PROCEDURE — OTHER INTRALESIONAL KENALOG: OTHER

## 2023-12-05 PROCEDURE — 11901 INJECT SKIN LESIONS >7: CPT

## 2023-12-05 PROCEDURE — 99212 OFFICE O/P EST SF 10 MIN: CPT | Mod: 25

## 2023-12-05 ASSESSMENT — LOCATION DETAILED DESCRIPTION DERM
LOCATION DETAILED: MID-OCCIPITAL SCALP
LOCATION DETAILED: RIGHT OCCIPITAL SCALP
LOCATION DETAILED: RIGHT SUPERIOR OCCIPITAL SCALP
LOCATION DETAILED: MID-FRONTAL SCALP
LOCATION DETAILED: RIGHT NASAL SIDEWALL
LOCATION DETAILED: LEFT NASAL DORSUM
LOCATION DETAILED: RIGHT INFERIOR OCCIPITAL SCALP

## 2023-12-05 ASSESSMENT — LOCATION SIMPLE DESCRIPTION DERM
LOCATION SIMPLE: RIGHT NOSE
LOCATION SIMPLE: POSTERIOR SCALP
LOCATION SIMPLE: NOSE
LOCATION SIMPLE: ANTERIOR SCALP

## 2023-12-05 ASSESSMENT — LOCATION ZONE DERM
LOCATION ZONE: NOSE
LOCATION ZONE: SCALP

## 2023-12-05 NOTE — PROCEDURE: INTRALESIONAL KENALOG
How Many Mls Were Removed From The 10 Mg/Ml (5ml) Vial When Preparing The Injectable Solution?: 0
Require Ndc Code?: Yes
Kenalog Preparation: Kenalog
Administered By (Optional): provider
Include Z78.9 (Other Specified Conditions Influencing Health Status) As An Associated Diagnosis?: No
Ndc# For Kenalog Only: 2946-8481-42
Medical Necessity Clause: This procedure was medically necessary because the lesions that were treated were:
Detail Level: Detailed
Kenalog Type Of Vial: Multiple Dose
Which Kenalog Vial Was Used?: Kenalog 10 mg/ml (5 ml vial)
Consent: - Consent was obtained prior to injection today.\\n- Discussed the potential risks and benefits of intralesional Kenalog injections including but not limited to risk of skin atrophy/indentation, unsuccessful/unsatisfactory treatment, and recurrence.\\n- Patient expressed understanding.
Lot # For Kenalog (Optional): [id number]
Expiration Date For Kenann (Optional): 10/2025
Total Volume (Ccs): 3.0
Concentration Of Kenalog Solution Injected (Mg/Ml): 10.0
Show Inventory Tab: Hide
Concentration Of Kenalog Solution Injected (Mg/Ml): 2.5
Total Volume (Ccs): 0.25

## 2023-12-05 NOTE — HPI: HAIR LOSS (ALOPECIA AREATA)
Is This A New Presentation, Or A Follow-Up?: Follow Up Alopecia Areata
Additional History: Injected Kenalog 2.5 at last office visit on brow and Kenalog 10mg/mL on scalp. Partial regrowth.

## 2023-12-05 NOTE — PROCEDURE: COUNSELING
Detail Level: Detailed
Patient Specific Counseling (Will Not Stick From Patient To Patient): - Discussed treatment options.\\n- Treatment not required and not medically necessary and would not be billed to insurance.
Detail Level: Zone
Patient Specific Counseling (Will Not Stick From Patient To Patient): - Discussed and recommended intralesional Kenalog injection.\\n- Advised that a series of injections may be necessary and are generally spaced out monthly until hair regrowth is achieved.

## 2023-12-18 ENCOUNTER — LAB (OUTPATIENT)
Dept: LAB | Facility: CLINIC | Age: 38
End: 2023-12-18
Payer: COMMERCIAL

## 2023-12-18 DIAGNOSIS — R94.5 ABNORMAL RESULTS OF LIVER FUNCTION STUDIES: Primary | ICD-10-CM

## 2023-12-18 DIAGNOSIS — R94.5 ABNORMAL RESULTS OF LIVER FUNCTION STUDIES: ICD-10-CM

## 2023-12-18 LAB
ALBUMIN SERPL BCG-MCNC: 4.6 G/DL (ref 3.5–5.2)
ALP SERPL-CCNC: 107 U/L (ref 40–150)
ALT SERPL W P-5'-P-CCNC: 148 U/L (ref 0–70)
ANION GAP SERPL CALCULATED.3IONS-SCNC: 11 MMOL/L (ref 7–15)
AST SERPL W P-5'-P-CCNC: 59 U/L (ref 0–45)
BILIRUB DIRECT SERPL-MCNC: 0.38 MG/DL (ref 0–0.3)
BILIRUB SERPL-MCNC: 1.6 MG/DL
BUN SERPL-MCNC: 10.7 MG/DL (ref 6–20)
CALCIUM SERPL-MCNC: 9.3 MG/DL (ref 8.6–10)
CHLORIDE SERPL-SCNC: 103 MMOL/L (ref 98–107)
CREAT SERPL-MCNC: 0.8 MG/DL (ref 0.67–1.17)
DEPRECATED HCO3 PLAS-SCNC: 26 MMOL/L (ref 22–29)
EGFRCR SERPLBLD CKD-EPI 2021: >90 ML/MIN/1.73M2
ERYTHROCYTE [DISTWIDTH] IN BLOOD BY AUTOMATED COUNT: 12.3 % (ref 10–15)
FERRITIN SERPL-MCNC: 250 NG/ML (ref 31–409)
GLUCOSE SERPL-MCNC: 92 MG/DL (ref 70–99)
HCT VFR BLD AUTO: 46.6 % (ref 40–53)
HGB BLD-MCNC: 15.4 G/DL (ref 13.3–17.7)
INR PPP: 0.83 (ref 0.85–1.15)
IRON BINDING CAPACITY (ROCHE): 299 UG/DL (ref 240–430)
IRON SATN MFR SERPL: 33 % (ref 15–46)
IRON SERPL-MCNC: 99 UG/DL (ref 61–157)
MCH RBC QN AUTO: 28.7 PG (ref 26.5–33)
MCHC RBC AUTO-ENTMCNC: 33 G/DL (ref 31.5–36.5)
MCV RBC AUTO: 87 FL (ref 78–100)
PLATELET # BLD AUTO: 256 10E3/UL (ref 150–450)
POTASSIUM SERPL-SCNC: 3.6 MMOL/L (ref 3.4–5.3)
PROT SERPL-MCNC: 7.2 G/DL (ref 6.4–8.3)
RBC # BLD AUTO: 5.37 10E6/UL (ref 4.4–5.9)
SODIUM SERPL-SCNC: 140 MMOL/L (ref 135–145)
WBC # BLD AUTO: 6.3 10E3/UL (ref 4–11)

## 2023-12-18 PROCEDURE — 83540 ASSAY OF IRON: CPT

## 2023-12-18 PROCEDURE — 83550 IRON BINDING TEST: CPT

## 2023-12-18 PROCEDURE — 86038 ANTINUCLEAR ANTIBODIES: CPT

## 2023-12-18 PROCEDURE — 36415 COLL VENOUS BLD VENIPUNCTURE: CPT

## 2023-12-18 PROCEDURE — 82103 ALPHA-1-ANTITRYPSIN TOTAL: CPT | Mod: 90

## 2023-12-18 PROCEDURE — 86364 TISS TRNSGLTMNASE EA IG CLAS: CPT

## 2023-12-18 PROCEDURE — 82728 ASSAY OF FERRITIN: CPT

## 2023-12-18 PROCEDURE — 82248 BILIRUBIN DIRECT: CPT

## 2023-12-18 PROCEDURE — 81332 SERPINA1 GENE: CPT | Mod: 90

## 2023-12-18 PROCEDURE — 85610 PROTHROMBIN TIME: CPT

## 2023-12-18 PROCEDURE — 85027 COMPLETE CBC AUTOMATED: CPT

## 2023-12-18 PROCEDURE — 83516 IMMUNOASSAY NONANTIBODY: CPT | Mod: 90

## 2023-12-18 PROCEDURE — 86381 MITOCHONDRIAL ANTIBODY EACH: CPT

## 2023-12-18 PROCEDURE — 99000 SPECIMEN HANDLING OFFICE-LAB: CPT

## 2023-12-18 PROCEDURE — 86708 HEPATITIS A ANTIBODY: CPT

## 2023-12-18 PROCEDURE — 80053 COMPREHEN METABOLIC PANEL: CPT

## 2023-12-19 LAB
ANA SER QL IF: NEGATIVE
HAV IGG SER QL IA: NONREACTIVE
MITOCHONDRIA M2 IGG SER-ACNC: <1 U/ML
TTG IGA SER-ACNC: 0.5 U/ML
TTG IGG SER-ACNC: <0.6 U/ML

## 2023-12-20 LAB — SMA IGG SER-ACNC: 5 UNITS

## 2023-12-23 LAB
A1AT PHENOTYP SERPL-IMP: NORMAL
A1AT SERPL-MCNC: 124 MG/DL
A1AT SS SERPL-MCNC: NEGATIVE G/L
A1AT SZ SERPL-MCNC: NORMAL G/L
A1AT ZZ SERPL-MCNC: NEGATIVE G/L
SPECIMEN SOURCE: NORMAL

## 2024-01-12 ENCOUNTER — MYC REFILL (OUTPATIENT)
Dept: NEUROLOGY | Facility: CLINIC | Age: 39
End: 2024-01-12
Payer: COMMERCIAL

## 2024-01-12 DIAGNOSIS — R53.83 OTHER FATIGUE: ICD-10-CM

## 2024-01-12 DIAGNOSIS — G35 MULTIPLE SCLEROSIS (H): ICD-10-CM

## 2024-01-12 RX ORDER — DEXTROAMPHETAMINE SACCHARATE, AMPHETAMINE ASPARTATE, DEXTROAMPHETAMINE SULFATE AND AMPHETAMINE SULFATE 2.5; 2.5; 2.5; 2.5 MG/1; MG/1; MG/1; MG/1
10 TABLET ORAL 2 TIMES DAILY PRN
Qty: 60 TABLET | Refills: 0 | Status: SHIPPED | OUTPATIENT
Start: 2024-01-12 | End: 2024-01-12

## 2024-01-12 RX ORDER — DEXTROAMPHETAMINE SACCHARATE, AMPHETAMINE ASPARTATE, DEXTROAMPHETAMINE SULFATE AND AMPHETAMINE SULFATE 2.5; 2.5; 2.5; 2.5 MG/1; MG/1; MG/1; MG/1
10 TABLET ORAL 2 TIMES DAILY PRN
Qty: 60 TABLET | Refills: 0 | Status: SHIPPED | OUTPATIENT
Start: 2024-01-12 | End: 2024-02-21

## 2024-01-12 NOTE — TELEPHONE ENCOUNTER
Pt requesting Adderall sent to [facility] instead of [facility], as [facility] is out. Rx sent earlier today by Dr Salas, as Dr Escobar is out of the clinic. Routing request to Dr Salas.    Last office visit May 2023, follow-up with Dr Escobar scheduled for May 2024.    Araceli Penn RN

## 2024-01-12 NOTE — TELEPHONE ENCOUNTER
Patient requesting refill of their Adderall; Patient was last seen in May 2023 and has follow up appointment in May 2024 with Dr Escobar. Pended rx to Dr Salas for approval, as Dr Escobar is currently out of the clinic until 1/23.    Araceli Penn RN

## 2024-01-30 ENCOUNTER — LAB (OUTPATIENT)
Dept: LAB | Facility: CLINIC | Age: 39
End: 2024-01-30
Payer: COMMERCIAL

## 2024-01-30 DIAGNOSIS — R94.5 ABNORMAL RESULTS OF LIVER FUNCTION STUDIES: ICD-10-CM

## 2024-01-30 PROCEDURE — 36415 COLL VENOUS BLD VENIPUNCTURE: CPT

## 2024-01-30 PROCEDURE — 80076 HEPATIC FUNCTION PANEL: CPT

## 2024-01-31 LAB
ALBUMIN SERPL BCG-MCNC: 4.8 G/DL (ref 3.5–5.2)
ALP SERPL-CCNC: 111 U/L (ref 40–150)
ALT SERPL W P-5'-P-CCNC: 146 U/L (ref 0–70)
AST SERPL W P-5'-P-CCNC: 59 U/L (ref 0–45)
BILIRUB DIRECT SERPL-MCNC: 0.41 MG/DL (ref 0–0.3)
BILIRUB SERPL-MCNC: 2.1 MG/DL
PROT SERPL-MCNC: 7.5 G/DL (ref 6.4–8.3)

## 2024-02-01 ENCOUNTER — APPOINTMENT (OUTPATIENT)
Dept: URBAN - METROPOLITAN AREA CLINIC 252 | Age: 39
Setting detail: DERMATOLOGY
End: 2024-02-02

## 2024-02-01 VITALS — WEIGHT: 165 LBS | HEIGHT: 71 IN

## 2024-02-01 DIAGNOSIS — L63.8 OTHER ALOPECIA AREATA: ICD-10-CM

## 2024-02-01 PROCEDURE — OTHER INTRALESIONAL KENALOG: OTHER

## 2024-02-01 PROCEDURE — OTHER COUNSELING: OTHER

## 2024-02-01 PROCEDURE — 11901 INJECT SKIN LESIONS >7: CPT

## 2024-02-01 ASSESSMENT — LOCATION SIMPLE DESCRIPTION DERM
LOCATION SIMPLE: LEFT OCCIPITAL SCALP
LOCATION SIMPLE: POSTERIOR SCALP
LOCATION SIMPLE: ANTERIOR SCALP

## 2024-02-01 ASSESSMENT — LOCATION DETAILED DESCRIPTION DERM
LOCATION DETAILED: LEFT SUPERIOR OCCIPITAL SCALP
LOCATION DETAILED: RIGHT OCCIPITAL SCALP
LOCATION DETAILED: MID-FRONTAL SCALP
LOCATION DETAILED: RIGHT INFERIOR OCCIPITAL SCALP

## 2024-02-01 ASSESSMENT — LOCATION ZONE DERM: LOCATION ZONE: SCALP

## 2024-02-01 NOTE — PROCEDURE: COUNSELING
Detail Level: Zone
Patient Specific Counseling (Will Not Stick From Patient To Patient): - Discussed and recommended intralesional Kenalog injection.\\n- Advised that a series of injections may be necessary and are generally spaced out monthly until hair regrowth is achieved.\\n\\n-Discussed Olumiant. Informed pt that Olumiant combined with MS medication would not suggest an interaction but is unknown. Discussed risk of over suppressing immune system. Discussed slow progress to notice hair re growth with Olumiant. Discussed once Olumiant is stopped alopecia would most likely come back. Would start pt at higher dose and slowly decrease down. Pt defers Olumiant today as pt states overwhelmed with labs for MS not being wnl. \\n-Discussed risks with prolonged steroid use. Pt expressed understanding. \\n-Discussed minoxidil as it is not a steroid or immune suppressant medication. Would consider minoxidil 10 mg daily in the event that last injection is not successful and maintaining alopecia

## 2024-02-01 NOTE — PROCEDURE: INTRALESIONAL KENALOG
Detail Level: Detailed
How Many Mls Were Removed From The 40 Mg/Ml (10ml) Vial When Preparing The Injectable Solution?: 0
Concentration Of Kenalog Solution Injected (Mg/Ml): 10.0
Require Ndc Code?: Yes
Which Kenalog Vial Was Used?: Kenalog 10 mg/ml (5 ml vial)
Validate Note Data When Using Inventory: No
Kenalog Preparation: Kenalog
Ndc# For Kenalog Only: 5852-6695-83
Administered By (Optional): provider
Kenalog Type Of Vial: Multiple Dose
Consent: - Consent was obtained prior to injection today.\\n- Discussed the potential risks and benefits of intralesional Kenalog injections including but not limited to risk of skin atrophy/indentation, unsuccessful/unsatisfactory treatment, and recurrence.\\n- Patient expressed understanding.
Medical Necessity Clause: This procedure was medically necessary because the lesions that were treated were:
Total Volume (Ccs): 2.0
Show Inventory Tab: Hide

## 2024-02-01 NOTE — HPI: HAIR LOSS (ALOPECIA AREATA)
Is This A New Presentation, Or A Follow-Up?: Follow Up Alopecia Areata
Additional History: Regrew all hair since last office visit but now fell back out but over a smaller area.

## 2024-02-21 ENCOUNTER — MYC REFILL (OUTPATIENT)
Dept: NEUROLOGY | Facility: CLINIC | Age: 39
End: 2024-02-21
Payer: COMMERCIAL

## 2024-02-21 DIAGNOSIS — R53.83 OTHER FATIGUE: ICD-10-CM

## 2024-02-21 DIAGNOSIS — G35 MULTIPLE SCLEROSIS (H): ICD-10-CM

## 2024-02-21 RX ORDER — DEXTROAMPHETAMINE SACCHARATE, AMPHETAMINE ASPARTATE, DEXTROAMPHETAMINE SULFATE AND AMPHETAMINE SULFATE 2.5; 2.5; 2.5; 2.5 MG/1; MG/1; MG/1; MG/1
10 TABLET ORAL 2 TIMES DAILY PRN
Qty: 60 TABLET | Refills: 0 | Status: SHIPPED | OUTPATIENT
Start: 2024-02-21 | End: 2024-04-05

## 2024-03-13 ENCOUNTER — LAB (OUTPATIENT)
Dept: LAB | Facility: CLINIC | Age: 39
End: 2024-03-13
Payer: COMMERCIAL

## 2024-03-13 DIAGNOSIS — R94.5 ABNORMAL RESULTS OF LIVER FUNCTION STUDIES: ICD-10-CM

## 2024-03-13 PROCEDURE — 36415 COLL VENOUS BLD VENIPUNCTURE: CPT

## 2024-03-13 PROCEDURE — 80076 HEPATIC FUNCTION PANEL: CPT

## 2024-03-14 LAB
ALBUMIN SERPL BCG-MCNC: 4.5 G/DL (ref 3.5–5.2)
ALP SERPL-CCNC: 159 U/L (ref 40–150)
ALT SERPL W P-5'-P-CCNC: 109 U/L (ref 0–70)
AST SERPL W P-5'-P-CCNC: 48 U/L (ref 0–45)
BILIRUB DIRECT SERPL-MCNC: 0.24 MG/DL (ref 0–0.3)
BILIRUB SERPL-MCNC: 1.1 MG/DL
PROT SERPL-MCNC: 7.5 G/DL (ref 6.4–8.3)

## 2024-03-16 ENCOUNTER — HEALTH MAINTENANCE LETTER (OUTPATIENT)
Age: 39
End: 2024-03-16

## 2024-03-21 DIAGNOSIS — G35 MULTIPLE SCLEROSIS (H): ICD-10-CM

## 2024-03-21 RX ORDER — OZANIMOD HYDROCHLORIDE 0.92 MG/1
1 CAPSULE ORAL DAILY
Qty: 30 CAPSULE | Refills: 11 | Status: SHIPPED | OUTPATIENT
Start: 2024-03-21

## 2024-04-05 ENCOUNTER — APPOINTMENT (OUTPATIENT)
Dept: URBAN - METROPOLITAN AREA CLINIC 252 | Age: 39
Setting detail: DERMATOLOGY
End: 2024-04-05

## 2024-04-05 ENCOUNTER — MYC REFILL (OUTPATIENT)
Dept: NEUROLOGY | Facility: CLINIC | Age: 39
End: 2024-04-05
Payer: COMMERCIAL

## 2024-04-05 VITALS — HEIGHT: 71 IN | WEIGHT: 165 LBS

## 2024-04-05 DIAGNOSIS — G35 MULTIPLE SCLEROSIS (H): ICD-10-CM

## 2024-04-05 DIAGNOSIS — R53.83 OTHER FATIGUE: ICD-10-CM

## 2024-04-05 DIAGNOSIS — L63.8 OTHER ALOPECIA AREATA: ICD-10-CM

## 2024-04-05 PROCEDURE — OTHER PRESCRIPTION: OTHER

## 2024-04-05 PROCEDURE — 99214 OFFICE O/P EST MOD 30 MIN: CPT

## 2024-04-05 PROCEDURE — OTHER COUNSELING: OTHER

## 2024-04-05 RX ORDER — DEXTROAMPHETAMINE SACCHARATE, AMPHETAMINE ASPARTATE, DEXTROAMPHETAMINE SULFATE AND AMPHETAMINE SULFATE 2.5; 2.5; 2.5; 2.5 MG/1; MG/1; MG/1; MG/1
10 TABLET ORAL 2 TIMES DAILY PRN
Qty: 60 TABLET | Refills: 0 | Status: SHIPPED | OUTPATIENT
Start: 2024-04-05 | End: 2024-05-13

## 2024-04-05 RX ORDER — MINOXIDIL 2.5 MG/1
2.5 TABLET ORAL QD
Qty: 70 | Refills: 0 | Status: ERX | COMMUNITY
Start: 2024-04-05

## 2024-04-05 RX ORDER — MINOXIDIL 10 MG/1
10 MG TABLET ORAL QD
Qty: 90 | Refills: 4 | Status: ERX | COMMUNITY
Start: 2024-04-05

## 2024-04-05 ASSESSMENT — LOCATION DETAILED DESCRIPTION DERM
LOCATION DETAILED: RIGHT INFERIOR OCCIPITAL SCALP
LOCATION DETAILED: MID-OCCIPITAL SCALP

## 2024-04-05 ASSESSMENT — LOCATION ZONE DERM: LOCATION ZONE: SCALP

## 2024-04-05 ASSESSMENT — LOCATION SIMPLE DESCRIPTION DERM: LOCATION SIMPLE: POSTERIOR SCALP

## 2024-04-05 NOTE — CONFIDENTIAL NOTE
Patient requesting refill of their Adderall; Patient was last seen in May and has follow up appointment in May with Dr. Escobar. Pended rx to Dr. Escobar for signature and will send electronically to the pharmacy once signed.    Marysol Cotrez RN

## 2024-04-05 NOTE — HPI: HAIR LOSS (ALOPECIA AREATA)
Is This A New Presentation, Or A Follow-Up?: Follow Up Alopecia Areata
Additional History: Kenalog 10mg/mL used on posterior scalp at last office visit. No longer improving. The patient’s problem is exacerbating and not adequately controlled.

## 2024-04-05 NOTE — PROCEDURE: COUNSELING
Detail Level: Zone
Patient Specific Counseling (Will Not Stick From Patient To Patient): - Discussed treatment options of oral Minoxidil vs Olumiant vs combonatioe. Since already on an immune supporessent safety of mixing is unknown. Discussed risks of minoxidil in detail. \\n-Discussed the side effects of Lucian inhibitors.\\n- recommended upward titration of oral monoxidil. Call if any side effects. Give 6 months before deciding efficacy. Patient expressed understanding.

## 2024-05-03 ENCOUNTER — LAB (OUTPATIENT)
Dept: LAB | Facility: CLINIC | Age: 39
End: 2024-05-03
Payer: COMMERCIAL

## 2024-05-03 DIAGNOSIS — R94.5 ABNORMAL RESULTS OF LIVER FUNCTION STUDIES: ICD-10-CM

## 2024-05-03 PROCEDURE — 36415 COLL VENOUS BLD VENIPUNCTURE: CPT

## 2024-05-03 PROCEDURE — 80076 HEPATIC FUNCTION PANEL: CPT

## 2024-05-04 LAB
ALBUMIN SERPL BCG-MCNC: 4.7 G/DL (ref 3.5–5.2)
ALP SERPL-CCNC: 83 U/L (ref 40–150)
ALT SERPL W P-5'-P-CCNC: 74 U/L (ref 0–70)
AST SERPL W P-5'-P-CCNC: 41 U/L (ref 0–45)
BILIRUB DIRECT SERPL-MCNC: 0.41 MG/DL (ref 0–0.3)
BILIRUB SERPL-MCNC: 2.2 MG/DL
PROT SERPL-MCNC: 7.1 G/DL (ref 6.4–8.3)

## 2024-05-13 ENCOUNTER — MYC REFILL (OUTPATIENT)
Dept: NEUROLOGY | Facility: CLINIC | Age: 39
End: 2024-05-13
Payer: COMMERCIAL

## 2024-05-13 DIAGNOSIS — G35 MULTIPLE SCLEROSIS (H): ICD-10-CM

## 2024-05-13 DIAGNOSIS — R53.83 OTHER FATIGUE: ICD-10-CM

## 2024-05-13 RX ORDER — DEXTROAMPHETAMINE SACCHARATE, AMPHETAMINE ASPARTATE, DEXTROAMPHETAMINE SULFATE AND AMPHETAMINE SULFATE 2.5; 2.5; 2.5; 2.5 MG/1; MG/1; MG/1; MG/1
10 TABLET ORAL 2 TIMES DAILY PRN
Qty: 60 TABLET | Refills: 0 | Status: SHIPPED | OUTPATIENT
Start: 2024-05-13 | End: 2024-06-19

## 2024-05-21 ENCOUNTER — OFFICE VISIT (OUTPATIENT)
Dept: NEUROLOGY | Facility: CLINIC | Age: 39
End: 2024-05-21
Attending: PSYCHIATRY & NEUROLOGY
Payer: COMMERCIAL

## 2024-05-21 VITALS
WEIGHT: 178.9 LBS | OXYGEN SATURATION: 98 % | HEART RATE: 66 BPM | DIASTOLIC BLOOD PRESSURE: 79 MMHG | BODY MASS INDEX: 25.31 KG/M2 | SYSTOLIC BLOOD PRESSURE: 139 MMHG

## 2024-05-21 DIAGNOSIS — G35 MULTIPLE SCLEROSIS (H): Primary | ICD-10-CM

## 2024-05-21 DIAGNOSIS — R53.83 OTHER FATIGUE: ICD-10-CM

## 2024-05-21 DIAGNOSIS — R94.5 ABNORMAL RESULTS OF LIVER FUNCTION STUDIES: ICD-10-CM

## 2024-05-21 PROCEDURE — 99214 OFFICE O/P EST MOD 30 MIN: CPT | Performed by: PSYCHIATRY & NEUROLOGY

## 2024-05-21 RX ORDER — MINOXIDIL 10 MG/1
TABLET ORAL
COMMUNITY
Start: 2024-04-30

## 2024-05-21 ASSESSMENT — PAIN SCALES - GENERAL: PAINLEVEL: NO PAIN (0)

## 2024-05-21 NOTE — Clinical Note
5/21/2024       RE: Real Sellers  [address]  [facility] [address]     Dear Colleague,    Thank you for referring your patient, Real Sellers, to the [facility]. Please see a copy of my visit note below.    No notes on file    Again, thank you for allowing me to participate in the care of your patient.      Sincerely,    Yousuf Escobar MD

## 2024-05-21 NOTE — NURSING NOTE
Chief Complaint   Patient presents with    MS    RECHECK     Annual follow up      Vitals were taken and medications were reconciled.   Thomas Shen, EMT  9:24 AM

## 2024-06-19 ENCOUNTER — MYC REFILL (OUTPATIENT)
Dept: NEUROLOGY | Facility: CLINIC | Age: 39
End: 2024-06-19
Payer: COMMERCIAL

## 2024-06-19 DIAGNOSIS — R53.83 OTHER FATIGUE: ICD-10-CM

## 2024-06-19 DIAGNOSIS — G35 MULTIPLE SCLEROSIS (H): ICD-10-CM

## 2024-06-19 RX ORDER — DEXTROAMPHETAMINE SACCHARATE, AMPHETAMINE ASPARTATE, DEXTROAMPHETAMINE SULFATE AND AMPHETAMINE SULFATE 2.5; 2.5; 2.5; 2.5 MG/1; MG/1; MG/1; MG/1
10 TABLET ORAL 2 TIMES DAILY PRN
Qty: 60 TABLET | Refills: 0 | Status: SHIPPED | OUTPATIENT
Start: 2024-06-19 | End: 2024-07-23

## 2024-06-19 NOTE — TELEPHONE ENCOUNTER
Patient requesting refill of their Adderall; Patient was last seen in May 2024 and has follow up appointment in May 2025 with Dr Escobar. Pended rx to Dr Escobar for signature and will send electronically to the pharmacy once signed.    Araceli Penn RN

## 2024-06-24 ENCOUNTER — LAB (OUTPATIENT)
Dept: LAB | Facility: CLINIC | Age: 39
End: 2024-06-24
Payer: COMMERCIAL

## 2024-06-24 DIAGNOSIS — R94.5 ABNORMAL RESULTS OF LIVER FUNCTION STUDIES: ICD-10-CM

## 2024-06-24 LAB
ALBUMIN SERPL BCG-MCNC: 4.5 G/DL (ref 3.5–5.2)
ALP SERPL-CCNC: 81 U/L (ref 40–150)
ALT SERPL W P-5'-P-CCNC: 79 U/L (ref 0–70)
AST SERPL W P-5'-P-CCNC: 44 U/L (ref 0–45)
BILIRUB DIRECT SERPL-MCNC: 0.39 MG/DL (ref 0–0.3)
BILIRUB SERPL-MCNC: 2 MG/DL
PROT SERPL-MCNC: 7.1 G/DL (ref 6.4–8.3)

## 2024-06-24 PROCEDURE — 80076 HEPATIC FUNCTION PANEL: CPT

## 2024-06-24 PROCEDURE — 36415 COLL VENOUS BLD VENIPUNCTURE: CPT

## 2024-07-23 ENCOUNTER — MYC REFILL (OUTPATIENT)
Dept: NEUROLOGY | Facility: CLINIC | Age: 39
End: 2024-07-23
Payer: COMMERCIAL

## 2024-07-23 DIAGNOSIS — R53.83 OTHER FATIGUE: ICD-10-CM

## 2024-07-23 DIAGNOSIS — G35 MULTIPLE SCLEROSIS (H): ICD-10-CM

## 2024-07-24 RX ORDER — DEXTROAMPHETAMINE SACCHARATE, AMPHETAMINE ASPARTATE, DEXTROAMPHETAMINE SULFATE AND AMPHETAMINE SULFATE 2.5; 2.5; 2.5; 2.5 MG/1; MG/1; MG/1; MG/1
10 TABLET ORAL 2 TIMES DAILY PRN
Qty: 60 TABLET | Refills: 0 | Status: SHIPPED | OUTPATIENT
Start: 2024-07-24 | End: 2024-09-09

## 2024-07-24 NOTE — CONFIDENTIAL NOTE
Patient requesting refill of their Adderall; Patient was last seen in May and has follow up appointment next May with Dr. Escobar. Pended rx to Dr. Escobar for signature and will send electronically to the pharmacy once signed.    Marysol Cortez RN

## 2024-09-09 ENCOUNTER — MYC REFILL (OUTPATIENT)
Dept: NEUROLOGY | Facility: CLINIC | Age: 39
End: 2024-09-09
Payer: COMMERCIAL

## 2024-09-09 DIAGNOSIS — R53.83 OTHER FATIGUE: ICD-10-CM

## 2024-09-09 DIAGNOSIS — G35 MULTIPLE SCLEROSIS (H): ICD-10-CM

## 2024-09-09 RX ORDER — DEXTROAMPHETAMINE SACCHARATE, AMPHETAMINE ASPARTATE, DEXTROAMPHETAMINE SULFATE AND AMPHETAMINE SULFATE 2.5; 2.5; 2.5; 2.5 MG/1; MG/1; MG/1; MG/1
10 TABLET ORAL 2 TIMES DAILY PRN
Qty: 60 TABLET | Refills: 0 | Status: SHIPPED | OUTPATIENT
Start: 2024-09-09

## 2024-10-18 ENCOUNTER — MYC REFILL (OUTPATIENT)
Dept: NEUROLOGY | Facility: CLINIC | Age: 39
End: 2024-10-18
Payer: COMMERCIAL

## 2024-10-18 DIAGNOSIS — R53.83 OTHER FATIGUE: ICD-10-CM

## 2024-10-18 DIAGNOSIS — G35 MULTIPLE SCLEROSIS (H): ICD-10-CM

## 2024-10-18 RX ORDER — DEXTROAMPHETAMINE SACCHARATE, AMPHETAMINE ASPARTATE, DEXTROAMPHETAMINE SULFATE AND AMPHETAMINE SULFATE 2.5; 2.5; 2.5; 2.5 MG/1; MG/1; MG/1; MG/1
10 TABLET ORAL 2 TIMES DAILY PRN
Qty: 60 TABLET | Refills: 0 | Status: SHIPPED | OUTPATIENT
Start: 2024-10-18

## 2024-10-22 ENCOUNTER — APPOINTMENT (OUTPATIENT)
Dept: URBAN - METROPOLITAN AREA CLINIC 252 | Age: 39
Setting detail: DERMATOLOGY
End: 2024-10-23

## 2024-10-22 VITALS — HEIGHT: 70 IN | WEIGHT: 170 LBS

## 2024-10-22 DIAGNOSIS — L63.8 OTHER ALOPECIA AREATA: ICD-10-CM

## 2024-10-22 PROCEDURE — OTHER VENIPUNCTURE: OTHER

## 2024-10-22 PROCEDURE — OTHER COUNSELING: OTHER

## 2024-10-22 PROCEDURE — OTHER PRESCRIPTION: OTHER

## 2024-10-22 PROCEDURE — 36415 COLL VENOUS BLD VENIPUNCTURE: CPT

## 2024-10-22 PROCEDURE — OTHER ADDITIONAL NOTES: OTHER

## 2024-10-22 PROCEDURE — OTHER ORDER TESTS: OTHER

## 2024-10-22 PROCEDURE — OTHER OLUMIANT INITIATION: OTHER

## 2024-10-22 PROCEDURE — 99214 OFFICE O/P EST MOD 30 MIN: CPT

## 2024-10-22 RX ORDER — BARICITINIB 4 MG/1
4MG TABLET, FILM COATED ORAL
Qty: 30 | Refills: 0 | Status: ERX | COMMUNITY
Start: 2024-10-22

## 2024-10-22 ASSESSMENT — LOCATION ZONE DERM
LOCATION ZONE: SCALP
LOCATION ZONE: ARM

## 2024-10-22 ASSESSMENT — LOCATION DETAILED DESCRIPTION DERM
LOCATION DETAILED: LEFT ANTECUBITAL SKIN
LOCATION DETAILED: LEFT DISTAL DORSAL FOREARM
LOCATION DETAILED: HAIR
LOCATION DETAILED: RIGHT DISTAL DORSAL FOREARM

## 2024-10-22 ASSESSMENT — SEVERITY ASSESSMENT OVERALL AMONG ALL PATIENTS
IN YOUR EXPERIENCE, AMONG ALL PATIENTS YOU HAVE SEEN WITH THIS CONDITION, HOW SEVERE IS THIS PATIENT'S CONDITION?: S3 (50-74% HAIR LOSS)

## 2024-10-22 ASSESSMENT — LOCATION SIMPLE DESCRIPTION DERM
LOCATION SIMPLE: LEFT UPPER ARM
LOCATION SIMPLE: LEFT FOREARM
LOCATION SIMPLE: RIGHT FOREARM
LOCATION SIMPLE: HAIR

## 2024-10-22 ASSESSMENT — SEVERITY OF ALOPECIA TOOL: % SCALP HAIR LOST: 50

## 2024-10-22 NOTE — PROCEDURE: OLUMIANT INITIATION
Detail Level: Zone
Pregnancy And Lactation Warning Text: Based on animal studies, Olumiant may cause embryo-fetal harm when administered to pregnant women.  The medication should not be used in pregnancy.  Breastfeeding is not recommended during treatment.
Olumiant Dosing: 4mg Daily
Is Soriatane Contraindicated?: No
Add Pregnancy And Lactation Warning To Medication Counseling?: Yes
Diagnosis (Required): Alopecia Areata
Olumiant Monitoring Guidelines: - Blood work including CBC, lipids, hepatitis screening, and TB screening should be checked prior to initiating Olumiant therapy.  \\n- Labs will also be checked periodically after the initiation period.

## 2024-10-22 NOTE — PROCEDURE: ORDER TESTS
Expected Date Of Service: 10/22/2024
Billing Type: Third-Party Bill
Bill For Surgical Tray: no
Performing Laboratory: [id number]

## 2024-10-22 NOTE — PROCEDURE: ADDITIONAL NOTES
Detail Level: Simple
Additional Notes: - A 1 month supply of 4mg Olumiant samples were provided to patient. \\n- Directed patient to hold off on beginning the Olumiant samples until they are notified about normal baseline bloodwork, specifically negative QFG and Hepatitis B.\\n- Discussed the risks/dangers of beginning Olumiant without first confirming negative TB test and Hepatitis B test. \\n- Patient expressed understanding and is in agreement to hold off on starting Olumiant until directed by our clinic. \\n- Recommended making a 5 week follow-up appointment so it will be roughly 1 month after beginning Olumiant.\\n- Recommended coming to subsequent appointments fasting for 8 hours other than water for accurate cholesterol monitoring.
Render Risk Assessment In Note?: no

## 2024-10-22 NOTE — HPI: HAIR LOSS (ALOPECIA AREATA)
Is This A New Presentation, Or A Follow-Up?: Follow Up Alopecia Areata
Additional History: Taking oral minoxidil 10mg qd. Denies side effects. Denies improvement. The patient’s problem is exacerbating and not adequately controlled. Stopped about 2 weeks ago.  Has alopecia on arms as well.  Still using Multiple Sclerosis drug that he may be getting off soon. He reports his whole body has about 50% affected. Scalp is about 20%.  Patient is not fasting today.    5 half lives of zeposia is 4 days.

## 2024-10-22 NOTE — PROCEDURE: COUNSELING
Detail Level: Generalized
Patient Specific Counseling (Will Not Stick From Patient To Patient): - Discussed in great detail the advantages and disadvantages of starting a rfansisco inhibitor. Recommended not restarting oral minoxidil since he reports no effectl \\n- pt discussed meeting with his MS  to get off of his medication or to switch. \\n- discussed getting baseline bloodwork today to get the ball rolling on the approval for Olumiant. \\n- Once the pt get the clear to begin this pt will follow up one month after starting Olumiant.\\n- Pt has d/c minoxidil as this was not showing any improvement. \\n- Side effects were discussed in great detail with the pt and that it is highly recommended to get the shingles vaccine. \\n- pt is to make sure zeposia is out of his system for 5 days prior to starting.  \\n- paperwork was filled out and sent in to start the process.

## 2024-10-31 RX ORDER — BARICITINIB 4 MG/1
4MG TABLET, FILM COATED ORAL
Qty: 30 | Refills: 0 | Status: ERX

## 2024-11-25 ENCOUNTER — MYC REFILL (OUTPATIENT)
Dept: NEUROLOGY | Facility: CLINIC | Age: 39
End: 2024-11-25
Payer: COMMERCIAL

## 2024-11-25 DIAGNOSIS — R53.83 OTHER FATIGUE: ICD-10-CM

## 2024-11-25 DIAGNOSIS — G35 MULTIPLE SCLEROSIS (H): ICD-10-CM

## 2024-11-26 RX ORDER — DEXTROAMPHETAMINE SACCHARATE, AMPHETAMINE ASPARTATE, DEXTROAMPHETAMINE SULFATE AND AMPHETAMINE SULFATE 2.5; 2.5; 2.5; 2.5 MG/1; MG/1; MG/1; MG/1
10 TABLET ORAL 2 TIMES DAILY PRN
Qty: 60 TABLET | Refills: 0 | Status: SHIPPED | OUTPATIENT
Start: 2024-11-26

## 2024-12-16 ENCOUNTER — APPOINTMENT (OUTPATIENT)
Dept: URBAN - METROPOLITAN AREA CLINIC 252 | Age: 39
Setting detail: DERMATOLOGY
End: 2024-12-16

## 2024-12-16 VITALS — HEIGHT: 70 IN | WEIGHT: 165 LBS

## 2024-12-16 DIAGNOSIS — L63.8 OTHER ALOPECIA AREATA: ICD-10-CM

## 2024-12-16 PROCEDURE — OTHER VENIPUNCTURE: OTHER

## 2024-12-16 PROCEDURE — OTHER PRESCRIPTION: OTHER

## 2024-12-16 PROCEDURE — OTHER MIPS QUALITY: OTHER

## 2024-12-16 PROCEDURE — 36415 COLL VENOUS BLD VENIPUNCTURE: CPT

## 2024-12-16 PROCEDURE — OTHER COUNSELING: OTHER

## 2024-12-16 PROCEDURE — OTHER ORDER TESTS: OTHER

## 2024-12-16 PROCEDURE — OTHER OLUMIANT MONITORING: OTHER

## 2024-12-16 PROCEDURE — 99214 OFFICE O/P EST MOD 30 MIN: CPT

## 2024-12-16 RX ORDER — BARICITINIB 4 MG/1
4MG TABLET, FILM COATED ORAL
Qty: 30 | Refills: 2 | Status: ERX

## 2024-12-16 ASSESSMENT — LOCATION DETAILED DESCRIPTION DERM
LOCATION DETAILED: RIGHT LATERAL SUPERIOR EYELID
LOCATION DETAILED: LEFT ANTECUBITAL SKIN
LOCATION DETAILED: LEFT LATERAL SUPERIOR EYELID
LOCATION DETAILED: LEFT SUPERIOR PARIETAL SCALP

## 2024-12-16 ASSESSMENT — LOCATION SIMPLE DESCRIPTION DERM
LOCATION SIMPLE: LEFT SUPERIOR EYELID
LOCATION SIMPLE: RIGHT SUPERIOR EYELID
LOCATION SIMPLE: LEFT UPPER ARM
LOCATION SIMPLE: SCALP

## 2024-12-16 ASSESSMENT — LOCATION ZONE DERM
LOCATION ZONE: EYELID
LOCATION ZONE: ARM
LOCATION ZONE: SCALP

## 2024-12-16 NOTE — PROCEDURE: ORDER TESTS
Performing Laboratory: [id number]
Bill For Surgical Tray: no
Expected Date Of Service: 12/16/2024
Billing Type: Third-Party Bill

## 2024-12-16 NOTE — HPI: HAIR LOSS (ALOPECIA AREATA)
Is This A New Presentation, Or A Follow-Up?: Follow Up Alopecia Areata
Additional History: Started olumiant 45 days ago.   Denies any side effects whatsoever. History of elevated liver enzymes and managed by gastro currently.

## 2025-01-14 ENCOUNTER — TELEPHONE (OUTPATIENT)
Dept: NEUROLOGY | Facility: CLINIC | Age: 40
End: 2025-01-14
Payer: COMMERCIAL

## 2025-01-14 NOTE — TELEPHONE ENCOUNTER
PA Initiation    Medication: ZEPOSIA 0.92 MG PO CAPS  Insurance Company: [facility] - Phone [phone] Fax [phone]  Pharmacy Filling the Rx: [facility] - [place], [place] - [address]  Filling Pharmacy Phone:    Filling Pharmacy Fax:    Start Date: 1/14/2025          Thank you,    Rachael Burch [facility]  Specialty Pharmacy Clinic Liaison - CardiologyNeurologyMultiple [facility] [address]  [address] [place] [address]  Ph: [phone] Fax: [phone]  [email or web address]

## 2025-01-14 NOTE — TELEPHONE ENCOUNTER
PA Needed    Medication: Zeposia New Insurance [place]  QTY/DS: 30 per 30 days  NEW INS: yes  Insurance Company:  [employer]  Pharmacy Filling the Rx:  [facility]  PA : n/a   Date of last fill: 24

## 2025-01-15 NOTE — TELEPHONE ENCOUNTER
Prior Authorization Approval    Medication: ZEPOSIA 0.92 MG PO CAPS  Authorization Effective Date: 12/15/2024  Authorization Expiration Date: 1/14/2026  Approved Dose/Quantity: 30 days  Reference #: CMM KEY: [id number]   Insurance Company: [employer] [place] - Phone [phone] Fax [phone]  Expected CoPay: $    CoPay Card Available:      Financial Assistance Needed:   Which Pharmacy is filling the prescription: [facility] [place] [place] [facility] [address] [facility]  Pharmacy Notified: Yes  Patient Notified: Yes        Thank you,    Rachael Burch h-T  Specialty Pharmacy Clinic Liaison - [facility] [address] [address]  Ph: [phone] Fax: [phone]  [email or web address]

## 2025-01-22 ENCOUNTER — RX ONLY (RX ONLY)
Age: 40
End: 2025-01-22

## 2025-01-22 RX ORDER — BARICITINIB 4 MG/1
4MG TABLET, FILM COATED ORAL
Qty: 30 | Refills: 1 | Status: ERX

## 2025-01-27 ENCOUNTER — RX ONLY (RX ONLY)
Age: 40
End: 2025-01-27

## 2025-01-27 RX ORDER — BARICITINIB 4 MG/1
4MG TABLET, FILM COATED ORAL
Qty: 30 | Refills: 1 | Status: ERX

## 2025-02-05 ENCOUNTER — MYC REFILL (OUTPATIENT)
Dept: NEUROLOGY | Facility: CLINIC | Age: 40
End: 2025-02-05
Payer: COMMERCIAL

## 2025-02-05 DIAGNOSIS — G35 MULTIPLE SCLEROSIS (H): ICD-10-CM

## 2025-02-05 DIAGNOSIS — R53.83 OTHER FATIGUE: ICD-10-CM

## 2025-02-06 RX ORDER — DEXTROAMPHETAMINE SACCHARATE, AMPHETAMINE ASPARTATE, DEXTROAMPHETAMINE SULFATE AND AMPHETAMINE SULFATE 2.5; 2.5; 2.5; 2.5 MG/1; MG/1; MG/1; MG/1
10 TABLET ORAL 2 TIMES DAILY PRN
Qty: 60 TABLET | Refills: 0 | Status: SHIPPED | OUTPATIENT
Start: 2025-02-06

## 2025-03-17 ENCOUNTER — MYC REFILL (OUTPATIENT)
Dept: NEUROLOGY | Facility: CLINIC | Age: 40
End: 2025-03-17
Payer: COMMERCIAL

## 2025-03-17 DIAGNOSIS — R53.83 OTHER FATIGUE: ICD-10-CM

## 2025-03-17 DIAGNOSIS — G35 MULTIPLE SCLEROSIS (H): ICD-10-CM

## 2025-03-17 RX ORDER — DEXTROAMPHETAMINE SACCHARATE, AMPHETAMINE ASPARTATE, DEXTROAMPHETAMINE SULFATE AND AMPHETAMINE SULFATE 2.5; 2.5; 2.5; 2.5 MG/1; MG/1; MG/1; MG/1
10 TABLET ORAL 2 TIMES DAILY PRN
Qty: 60 TABLET | Refills: 0 | Status: SHIPPED | OUTPATIENT
Start: 2025-03-17

## 2025-03-20 DIAGNOSIS — G35 MULTIPLE SCLEROSIS (H): ICD-10-CM

## 2025-03-20 RX ORDER — OZANIMOD HYDROCHLORIDE 0.92 MG/1
1 CAPSULE ORAL DAILY
Qty: 30 CAPSULE | Refills: 11 | Status: SHIPPED | OUTPATIENT
Start: 2025-03-20

## 2025-03-20 NOTE — TELEPHONE ENCOUNTER
Received refill request for Zeposia from [facility]; Patient was last seen in May 2024 and has follow up appointment in May 2025 with Dr Escobar. Refilled per [facility] refill protocol.    Araceli Penn RN

## 2025-03-22 ENCOUNTER — HEALTH MAINTENANCE LETTER (OUTPATIENT)
Age: 40
End: 2025-03-22

## 2025-03-31 ENCOUNTER — RX ONLY (RX ONLY)
Age: 40
End: 2025-03-31

## 2025-03-31 RX ORDER — BARICITINIB 4 MG/1
4MG TABLET, FILM COATED ORAL
Qty: 30 | Refills: 0 | Status: ERX

## 2025-04-01 ENCOUNTER — APPOINTMENT (OUTPATIENT)
Dept: URBAN - METROPOLITAN AREA CLINIC 252 | Age: 40
Setting detail: DERMATOLOGY
End: 2025-04-01

## 2025-04-01 VITALS — HEIGHT: 70 IN | WEIGHT: 160 LBS

## 2025-04-01 DIAGNOSIS — L63.8 OTHER ALOPECIA AREATA: ICD-10-CM

## 2025-04-01 PROCEDURE — OTHER OLUMIANT MONITORING: OTHER

## 2025-04-01 PROCEDURE — OTHER ORDER TESTS: OTHER

## 2025-04-01 PROCEDURE — 36415 COLL VENOUS BLD VENIPUNCTURE: CPT

## 2025-04-01 PROCEDURE — 99214 OFFICE O/P EST MOD 30 MIN: CPT

## 2025-04-01 PROCEDURE — OTHER MIPS QUALITY: OTHER

## 2025-04-01 PROCEDURE — OTHER PRESCRIPTION: OTHER

## 2025-04-01 PROCEDURE — OTHER COUNSELING: OTHER

## 2025-04-01 PROCEDURE — OTHER VENIPUNCTURE: OTHER

## 2025-04-01 RX ORDER — BARICITINIB 4 MG/1
TABLET, FILM COATED ORAL
Qty: 30 | Refills: 2 | Status: ERX

## 2025-04-01 ASSESSMENT — LOCATION SIMPLE DESCRIPTION DERM
LOCATION SIMPLE: LEFT CHEEK
LOCATION SIMPLE: RIGHT ELBOW

## 2025-04-01 ASSESSMENT — LOCATION DETAILED DESCRIPTION DERM
LOCATION DETAILED: LEFT CENTRAL MALAR CHEEK
LOCATION DETAILED: RIGHT ANTECUBITAL SKIN

## 2025-04-01 ASSESSMENT — LOCATION ZONE DERM
LOCATION ZONE: ARM
LOCATION ZONE: FACE

## 2025-04-01 NOTE — HPI: HAIR LOSS (ALOPECIA AREATA)
Is This A New Presentation, Or A Follow-Up?: Follow Up Alopecia Areata
Additional History: Started olumiant late October 2024. Denies leg pain, rash, acne fever, chills, or any side effects. Bears, eyebrows and eyelashes have regrown but no changes to pccipital scalp.

## 2025-04-01 NOTE — PROCEDURE: COUNSELING
Detail Level: Zone
Patient Specific Counseling (Will Not Stick From Patient To Patient): Reviewed that due to large area of hair loss, ilk is not recommended due to significant exposure to steroid.\\nDiscuss continuing treatment to accomplish 9 months of being on the medication and evaluate efficacy: if no improvements will consider next asafo

## 2025-04-01 NOTE — PROCEDURE: VENIPUNCTURE
Detail Level: None
Venipuncture Paragraph: An alcohol pad was applied to the venipuncture site. Venipuncture was performed using a butterfly needle. Pressure and a bandaid was applied to the site. No complications were noted.
Number Of Tubes Drawn: 3
Bill 00340 For Specimen Handling/Conveyance To Laboratory?: no

## 2025-04-28 ENCOUNTER — MYC REFILL (OUTPATIENT)
Dept: NEUROLOGY | Facility: CLINIC | Age: 40
End: 2025-04-28
Payer: COMMERCIAL

## 2025-04-28 DIAGNOSIS — G35 MULTIPLE SCLEROSIS (H): ICD-10-CM

## 2025-04-28 DIAGNOSIS — R53.83 OTHER FATIGUE: ICD-10-CM

## 2025-04-28 RX ORDER — DEXTROAMPHETAMINE SACCHARATE, AMPHETAMINE ASPARTATE, DEXTROAMPHETAMINE SULFATE AND AMPHETAMINE SULFATE 2.5; 2.5; 2.5; 2.5 MG/1; MG/1; MG/1; MG/1
10 TABLET ORAL 2 TIMES DAILY PRN
Qty: 60 TABLET | Refills: 0 | Status: SHIPPED | OUTPATIENT
Start: 2025-04-28

## 2025-05-02 ENCOUNTER — RX ONLY (RX ONLY)
Age: 40
End: 2025-05-02

## 2025-05-02 ENCOUNTER — APPOINTMENT (OUTPATIENT)
Dept: URBAN - METROPOLITAN AREA CLINIC 252 | Age: 40
Setting detail: DERMATOLOGY
End: 2025-05-02

## 2025-05-02 VITALS — WEIGHT: 165 LBS | HEIGHT: 70 IN

## 2025-05-02 DIAGNOSIS — Z71.89 OTHER SPECIFIED COUNSELING: ICD-10-CM

## 2025-05-02 DIAGNOSIS — D22 MELANOCYTIC NEVI: ICD-10-CM

## 2025-05-02 DIAGNOSIS — L63.8 OTHER ALOPECIA AREATA: ICD-10-CM

## 2025-05-02 DIAGNOSIS — L81.4 OTHER MELANIN HYPERPIGMENTATION: ICD-10-CM

## 2025-05-02 PROBLEM — D22.71 MELANOCYTIC NEVI OF RIGHT LOWER LIMB, INCLUDING HIP: Status: ACTIVE | Noted: 2025-05-02

## 2025-05-02 PROBLEM — D22.72 MELANOCYTIC NEVI OF LEFT LOWER LIMB, INCLUDING HIP: Status: ACTIVE | Noted: 2025-05-02

## 2025-05-02 PROBLEM — D22.5 MELANOCYTIC NEVI OF TRUNK: Status: ACTIVE | Noted: 2025-05-02

## 2025-05-02 PROCEDURE — OTHER PRESCRIPTION MEDICATION MANAGEMENT: OTHER

## 2025-05-02 PROCEDURE — 99214 OFFICE O/P EST MOD 30 MIN: CPT

## 2025-05-02 PROCEDURE — OTHER COUNSELING: OTHER

## 2025-05-02 RX ORDER — BARICITINIB 4 MG/1
4MG TABLET, FILM COATED ORAL
Qty: 30 | Refills: 2 | Status: ERX

## 2025-05-02 ASSESSMENT — LOCATION SIMPLE DESCRIPTION DERM
LOCATION SIMPLE: RIGHT THIGH
LOCATION SIMPLE: RIGHT UPPER BACK
LOCATION SIMPLE: HAIR
LOCATION SIMPLE: RIGHT POSTERIOR THIGH
LOCATION SIMPLE: LEFT SHOULDER
LOCATION SIMPLE: RIGHT SHOULDER
LOCATION SIMPLE: LEFT THIGH
LOCATION SIMPLE: LEFT POSTERIOR THIGH

## 2025-05-02 ASSESSMENT — LOCATION ZONE DERM
LOCATION ZONE: ARM
LOCATION ZONE: TRUNK
LOCATION ZONE: SCALP
LOCATION ZONE: LEG

## 2025-05-02 ASSESSMENT — LOCATION DETAILED DESCRIPTION DERM
LOCATION DETAILED: RIGHT POSTERIOR SHOULDER
LOCATION DETAILED: RIGHT SUPERIOR MEDIAL UPPER BACK
LOCATION DETAILED: RIGHT INFERIOR MEDIAL UPPER BACK
LOCATION DETAILED: LEFT DISTAL POSTERIOR THIGH
LOCATION DETAILED: LEFT ANTERIOR PROXIMAL THIGH
LOCATION DETAILED: RIGHT DISTAL POSTERIOR THIGH
LOCATION DETAILED: LEFT POSTERIOR SHOULDER
LOCATION DETAILED: HAIR
LOCATION DETAILED: RIGHT MEDIAL UPPER BACK
LOCATION DETAILED: RIGHT ANTERIOR PROXIMAL THIGH

## 2025-05-20 ENCOUNTER — OFFICE VISIT (OUTPATIENT)
Dept: NEUROLOGY | Facility: CLINIC | Age: 40
End: 2025-05-20
Attending: PSYCHIATRY & NEUROLOGY
Payer: COMMERCIAL

## 2025-05-20 ENCOUNTER — ANCILLARY PROCEDURE (OUTPATIENT)
Dept: MRI IMAGING | Facility: CLINIC | Age: 40
End: 2025-05-20
Attending: PSYCHIATRY & NEUROLOGY
Payer: COMMERCIAL

## 2025-05-20 VITALS
HEART RATE: 67 BPM | HEIGHT: 71 IN | SYSTOLIC BLOOD PRESSURE: 142 MMHG | DIASTOLIC BLOOD PRESSURE: 78 MMHG | WEIGHT: 180.4 LBS | OXYGEN SATURATION: 96 % | BODY MASS INDEX: 25.26 KG/M2

## 2025-05-20 DIAGNOSIS — G35 MULTIPLE SCLEROSIS (H): ICD-10-CM

## 2025-05-20 DIAGNOSIS — G35 MS (MULTIPLE SCLEROSIS) (H): Primary | ICD-10-CM

## 2025-05-20 PROCEDURE — 70553 MRI BRAIN STEM W/O & W/DYE: CPT | Performed by: RADIOLOGY

## 2025-05-20 PROCEDURE — 1126F AMNT PAIN NOTED NONE PRSNT: CPT | Performed by: PSYCHIATRY & NEUROLOGY

## 2025-05-20 PROCEDURE — 3078F DIAST BP <80 MM HG: CPT | Performed by: PSYCHIATRY & NEUROLOGY

## 2025-05-20 PROCEDURE — 72156 MRI NECK SPINE W/O & W/DYE: CPT | Performed by: RADIOLOGY

## 2025-05-20 PROCEDURE — G2211 COMPLEX E/M VISIT ADD ON: HCPCS | Performed by: PSYCHIATRY & NEUROLOGY

## 2025-05-20 PROCEDURE — 3077F SYST BP >= 140 MM HG: CPT | Performed by: PSYCHIATRY & NEUROLOGY

## 2025-05-20 PROCEDURE — 99215 OFFICE O/P EST HI 40 MIN: CPT | Performed by: PSYCHIATRY & NEUROLOGY

## 2025-05-20 PROCEDURE — 99214 OFFICE O/P EST MOD 30 MIN: CPT | Performed by: PSYCHIATRY & NEUROLOGY

## 2025-05-20 PROCEDURE — A9585 GADOBUTROL INJECTION: HCPCS | Performed by: RADIOLOGY

## 2025-05-20 RX ORDER — GADOBUTROL 604.72 MG/ML
10 INJECTION INTRAVENOUS ONCE
Status: COMPLETED | OUTPATIENT
Start: 2025-05-20 | End: 2025-05-20

## 2025-05-20 RX ORDER — BARICITINIB 4 MG/1
TABLET, FILM COATED ORAL
COMMUNITY
Start: 2025-05-09

## 2025-05-20 RX ADMIN — GADOBUTROL 8 ML: 604.72 INJECTION INTRAVENOUS at 09:54

## 2025-05-20 ASSESSMENT — PAIN SCALES - GENERAL: PAINLEVEL_OUTOF10: NO PAIN (0)

## 2025-05-20 NOTE — NURSING NOTE
Chief Complaint   Patient presents with    MS    RECHECK     MS follow up      Vitals were taken and medications were reconciled.    Thomas Shen, EMT  11:25 AM

## 2025-05-20 NOTE — Clinical Note
5/20/2025       RE: Real Sellers  [address]  [facility] [address]     Dear Colleague,    Thank you for referring your patient, Real Sellers, to the [facility] CLINIC [place] [facility]. Please see a copy of my visit note below.    ID: Jair Sellers is a 40-year-old man who I follow for multiple sclerosis. He returns for annual follow-up and MRI review.    HPI: Jair has been neurologically stable.  His only residual symptoms at this point are mild bladder urgency and mild fatigue.  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  MRI and CSF were positive and he was diagnosed with MS and started on glatiramer acetate.  He apparently had several sensory relapses on that and was switched to Rebif in 2009 when he came under the care of Dr. Samaniego.  He was then switched to dimethyl fumarate in 2013 due to side effects with the interferon.  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  He felt poorly on that and felt he was getting more respiratory infections, and was switched to ozanimod in 2022.  He continues on that and tolerates it well.  He has been taking Olumiant for alopecia, feels it has not helped so far but he plans to give it a full year.  His general medical health has been stable.    No past medical history on file.   MS      Current Outpatient Medications:     amphetamine-dextroamphetamine (ADDERALL) 10 MG tablet, Take 1 tablet (10 mg) by mouth 2 times daily as needed (fatigue)., Disp: 60 tablet, Rfl: 0    OLUMIANT 4 MG TABS, , Disp: , Rfl:     Ozanimod HCl (ZEPOSIA) 0.92 MG CAPS, Take 1 capsule by mouth daily., Disp: 30 capsule, Rfl: 11    sertraline (ZOLOFT) 50 MG tablet, Take 1 tablet (50 mg) by mouth daily, Disp: 90 tablet, Rfl: 3     "minoxidil (LONITEN) 10 MG tablet, , Disp: , Rfl:     Exam: He is alert and oriented.  Affect is bright and language functions are normal.  Cranial nerves are unremarkable.  Muscle bulk, tone, strength and dexterity are normal in the arms and legs.  Light touch is intact in all 4 limbs.  Finger tapping, toe tapping and finger-nose-finger are normal.  Reflexes are normal and symmetric.  Gait is narrow-based and stable with normal tandem walk and negative Romberg.    We reviewed together his MRI of the brain and cervical spine done earlier today and compared them to prior exams.  Both showed a stable, moderate to severe burden of typical white matter lesions with no new or enhancing lesions.    Impression: Relapsing onset multiple sclerosis, clinically and radiologically stable on ozanimod.  I spent 45 minutes on his care on the date of service including chart review and face-to-face time.  We discussed the concurrent use of a Evelio inhibitor and the ozanimod.  I told him there is not much data that I have seen on their concurrent use and this is a \"doubling down\" on immunosuppression, but I had previously told him I think it is okay to try and it sounds like he will not continue to use it if he does not see any improvement in the alopecia.  Discussed MRI surveillance given that he is on an S1 P inhibitor and is AURA virus positive, at this point I would like to do screening brain MRIs every 2 years.  We discussed his longstanding abnormal liver tests.  He has not been following with hepatology recently as he says his dermatologist is following those tests.  The transaminases have been low positive but also his bilirubin has been mildly elevated.  I have felt that the former could be attributed to the ozanimod but the latter is unlikely to be a medication side effect from that.    The longitudinal plan of care for the diagnosis(es)/condition(s) as documented were addressed during this visit. Due to the added complexity in " care, I will continue to support Jair in the subsequent management and with ongoing continuity of care.    Plan: Follow-up in 1 year.    This note was completed in part using voice-recognition software, and some typographic errors may be present as a result.       Again, thank you for allowing me to participate in the care of your patient.      Sincerely,    Yousuf Escobar MD

## 2025-05-22 NOTE — PROGRESS NOTES
ID: Jair Sellers is a 40-year-old man who I follow for multiple sclerosis. He returns for annual follow-up and MRI review.    HPI: Jair has been neurologically stable.  His only residual symptoms at this point are mild bladder urgency and mild fatigue.  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  MRI and CSF were positive and he was diagnosed with MS and started on glatiramer acetate.  He apparently had several sensory relapses on that and was switched to Rebif in 2009 when he came under the care of Dr. Samaniego.  He was then switched to dimethyl fumarate in 2013 due to side effects with the interferon.  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  He felt poorly on that and felt he was getting more respiratory infections, and was switched to ozanimod in 2022.  He continues on that and tolerates it well.  He has been taking Olumiant for alopecia, feels it has not helped so far but he plans to give it a full year.  His general medical health has been stable.    No past medical history on file.   MS      Current Outpatient Medications:     amphetamine-dextroamphetamine (ADDERALL) 10 MG tablet, Take 1 tablet (10 mg) by mouth 2 times daily as needed (fatigue)., Disp: 60 tablet, Rfl: 0    OLUMIANT 4 MG TABS, , Disp: , Rfl:     Ozanimod HCl (ZEPOSIA) 0.92 MG CAPS, Take 1 capsule by mouth daily., Disp: 30 capsule, Rfl: 11    sertraline (ZOLOFT) 50 MG tablet, Take 1 tablet (50 mg) by mouth daily, Disp: 90 tablet, Rfl: 3    minoxidil (LONITEN) 10 MG tablet, , Disp: , Rfl:     Exam: He is alert and oriented.  Affect is bright and language functions are normal.  Cranial nerves are unremarkable.  Muscle bulk, tone, strength and dexterity are normal in the arms and legs.  Light touch is intact in all 4 limbs.  Finger tapping, toe tapping and finger-nose-finger are  "normal.  Reflexes are normal and symmetric.  Gait is narrow-based and stable with normal tandem walk and negative Romberg.    We reviewed together his MRI of the brain and cervical spine done earlier today and compared them to prior exams.  Both showed a stable, moderate to severe burden of typical white matter lesions with no new or enhancing lesions.    Impression: Relapsing onset multiple sclerosis, clinically and radiologically stable on ozanimod.  I spent 45 minutes on his care on the date of service including chart review and face-to-face time.  We discussed the concurrent use of a Evelio inhibitor and the ozanimod.  I told him there is not much data that I have seen on their concurrent use and this is a \"doubling down\" on immunosuppression, but I had previously told him I think it is okay to try and it sounds like he will not continue to use it if he does not see any improvement in the alopecia.  Discussed MRI surveillance given that he is on an S1 P inhibitor and is AURA virus positive, at this point I would like to do screening brain MRIs every 2 years.  We discussed his longstanding abnormal liver tests.  He has not been following with hepatology recently as he says his dermatologist is following those tests.  The transaminases have been low positive but also his bilirubin has been mildly elevated.  I have felt that the former could be attributed to the ozanimod but the latter is unlikely to be a medication side effect from that.    The longitudinal plan of care for the diagnosis(es)/condition(s) as documented were addressed during this visit. Due to the added complexity in care, I will continue to support Jair in the subsequent management and with ongoing continuity of care.    Plan: Follow-up in 1 year.    This note was completed in part using voice-recognition software, and some typographic errors may be present as a result.   "

## 2025-06-09 ENCOUNTER — MYC REFILL (OUTPATIENT)
Dept: NEUROLOGY | Facility: CLINIC | Age: 40
End: 2025-06-09
Payer: COMMERCIAL

## 2025-06-09 DIAGNOSIS — R53.83 OTHER FATIGUE: ICD-10-CM

## 2025-06-09 DIAGNOSIS — G35 MULTIPLE SCLEROSIS (H): ICD-10-CM

## 2025-06-09 RX ORDER — DEXTROAMPHETAMINE SACCHARATE, AMPHETAMINE ASPARTATE, DEXTROAMPHETAMINE SULFATE AND AMPHETAMINE SULFATE 2.5; 2.5; 2.5; 2.5 MG/1; MG/1; MG/1; MG/1
10 TABLET ORAL 2 TIMES DAILY PRN
Qty: 60 TABLET | Refills: 0 | Status: SHIPPED | OUTPATIENT
Start: 2025-06-09

## 2025-06-09 NOTE — TELEPHONE ENCOUNTER
Patient requesting refill of their Adderall; Patient was last seen in May 2025 and has follow up appointment in May 2026 with Dr Escobar. Pended rx to Dr Escobar for signature and will send electronically to the pharmacy once signed.    Araceli Penn RN

## 2025-06-16 DIAGNOSIS — G35 MULTIPLE SCLEROSIS (H): ICD-10-CM

## 2025-06-16 NOTE — TELEPHONE ENCOUNTER
Received refill request for sertraline from [facility]; Patient was last seen in May and has follow up appointment in May with Dr. Escobar. Refilled per [facility] refill protocol.    Marysol Cortez RN

## 2025-07-08 ENCOUNTER — MYC REFILL (OUTPATIENT)
Dept: NEUROLOGY | Facility: CLINIC | Age: 40
End: 2025-07-08
Payer: COMMERCIAL

## 2025-07-08 DIAGNOSIS — R53.83 OTHER FATIGUE: ICD-10-CM

## 2025-07-08 DIAGNOSIS — G35 MULTIPLE SCLEROSIS (H): ICD-10-CM

## 2025-07-08 RX ORDER — DEXTROAMPHETAMINE SACCHARATE, AMPHETAMINE ASPARTATE, DEXTROAMPHETAMINE SULFATE AND AMPHETAMINE SULFATE 2.5; 2.5; 2.5; 2.5 MG/1; MG/1; MG/1; MG/1
10 TABLET ORAL 2 TIMES DAILY PRN
Qty: 60 TABLET | Refills: 0 | Status: SHIPPED | OUTPATIENT
Start: 2025-07-08

## 2025-07-16 ENCOUNTER — RX ONLY (RX ONLY)
Age: 40
End: 2025-07-16

## 2025-07-16 RX ORDER — BARICITINIB 4 MG/1
4MG TABLET, FILM COATED ORAL
Qty: 30 | Refills: 0 | Status: ERX